# Patient Record
Sex: FEMALE | Race: WHITE | NOT HISPANIC OR LATINO | Employment: OTHER
[De-identification: names, ages, dates, MRNs, and addresses within clinical notes are randomized per-mention and may not be internally consistent; named-entity substitution may affect disease eponyms.]

---

## 2017-01-05 ENCOUNTER — RECORDS - HEALTHEAST (OUTPATIENT)
Dept: ADMINISTRATIVE | Facility: OTHER | Age: 77
End: 2017-01-05

## 2017-01-20 ENCOUNTER — RECORDS - HEALTHEAST (OUTPATIENT)
Dept: ADMINISTRATIVE | Facility: OTHER | Age: 77
End: 2017-01-20

## 2017-01-23 ENCOUNTER — OFFICE VISIT - HEALTHEAST (OUTPATIENT)
Dept: INTERNAL MEDICINE | Facility: CLINIC | Age: 77
End: 2017-01-23

## 2017-01-23 DIAGNOSIS — F41.9 ANXIETY: ICD-10-CM

## 2017-02-15 ENCOUNTER — COMMUNICATION - HEALTHEAST (OUTPATIENT)
Dept: INTERNAL MEDICINE | Facility: CLINIC | Age: 77
End: 2017-02-15

## 2017-02-21 ENCOUNTER — RECORDS - HEALTHEAST (OUTPATIENT)
Dept: ADMINISTRATIVE | Facility: OTHER | Age: 77
End: 2017-02-21

## 2017-02-21 ENCOUNTER — COMMUNICATION - HEALTHEAST (OUTPATIENT)
Dept: INTERNAL MEDICINE | Facility: CLINIC | Age: 77
End: 2017-02-21

## 2017-02-23 ENCOUNTER — COMMUNICATION - HEALTHEAST (OUTPATIENT)
Dept: INTERNAL MEDICINE | Facility: CLINIC | Age: 77
End: 2017-02-23

## 2017-02-24 ENCOUNTER — OFFICE VISIT - HEALTHEAST (OUTPATIENT)
Dept: INTERNAL MEDICINE | Facility: CLINIC | Age: 77
End: 2017-02-24

## 2017-02-24 DIAGNOSIS — F31.9 BIPOLAR DISORDER (H): ICD-10-CM

## 2017-03-06 ENCOUNTER — OFFICE VISIT - HEALTHEAST (OUTPATIENT)
Dept: PODIATRY | Facility: CLINIC | Age: 77
End: 2017-03-06

## 2017-03-06 DIAGNOSIS — L84 CALLUS OF FOOT: ICD-10-CM

## 2017-03-06 DIAGNOSIS — M79.673 PAIN OF FOOT, UNSPECIFIED LATERALITY: ICD-10-CM

## 2017-03-06 ASSESSMENT — MIFFLIN-ST. JEOR: SCORE: 1023.81

## 2017-04-11 ENCOUNTER — OFFICE VISIT - HEALTHEAST (OUTPATIENT)
Dept: INTERNAL MEDICINE | Facility: CLINIC | Age: 77
End: 2017-04-11

## 2017-04-11 ENCOUNTER — COMMUNICATION - HEALTHEAST (OUTPATIENT)
Dept: INTERNAL MEDICINE | Facility: CLINIC | Age: 77
End: 2017-04-11

## 2017-04-11 DIAGNOSIS — F31.9 BIPOLAR DISORDER (H): ICD-10-CM

## 2017-04-11 DIAGNOSIS — Z28.21 IMMUNIZATION REFUSED: ICD-10-CM

## 2017-04-11 DIAGNOSIS — I10 HTN (HYPERTENSION): ICD-10-CM

## 2017-04-11 DIAGNOSIS — G25.0 ESSENTIAL TREMOR: ICD-10-CM

## 2017-06-18 ENCOUNTER — COMMUNICATION - HEALTHEAST (OUTPATIENT)
Dept: INTERNAL MEDICINE | Facility: CLINIC | Age: 77
End: 2017-06-18

## 2017-06-20 ENCOUNTER — COMMUNICATION - HEALTHEAST (OUTPATIENT)
Dept: INTERNAL MEDICINE | Facility: CLINIC | Age: 77
End: 2017-06-20

## 2017-09-12 ENCOUNTER — OFFICE VISIT - HEALTHEAST (OUTPATIENT)
Dept: INTERNAL MEDICINE | Facility: CLINIC | Age: 77
End: 2017-09-12

## 2017-09-12 ENCOUNTER — COMMUNICATION - HEALTHEAST (OUTPATIENT)
Dept: INTERNAL MEDICINE | Facility: CLINIC | Age: 77
End: 2017-09-12

## 2017-09-12 DIAGNOSIS — G25.0 ESSENTIAL TREMOR: ICD-10-CM

## 2017-09-12 DIAGNOSIS — F31.9 BIPOLAR DISORDER (H): ICD-10-CM

## 2017-09-12 DIAGNOSIS — I10 HTN (HYPERTENSION): ICD-10-CM

## 2017-10-17 ENCOUNTER — COMMUNICATION - HEALTHEAST (OUTPATIENT)
Dept: INTERNAL MEDICINE | Facility: CLINIC | Age: 77
End: 2017-10-17

## 2017-10-17 DIAGNOSIS — F31.9 BIPOLAR DISORDER (H): ICD-10-CM

## 2018-03-14 ENCOUNTER — COMMUNICATION - HEALTHEAST (OUTPATIENT)
Dept: INTERNAL MEDICINE | Facility: CLINIC | Age: 78
End: 2018-03-14

## 2018-03-14 DIAGNOSIS — F31.9 BIPOLAR DISORDER (H): ICD-10-CM

## 2018-03-16 ENCOUNTER — COMMUNICATION - HEALTHEAST (OUTPATIENT)
Dept: INTERNAL MEDICINE | Facility: CLINIC | Age: 78
End: 2018-03-16

## 2018-03-29 ENCOUNTER — AMBULATORY - HEALTHEAST (OUTPATIENT)
Dept: LAB | Facility: CLINIC | Age: 78
End: 2018-03-29

## 2018-03-29 DIAGNOSIS — F31.9 BIPOLAR DISORDER (H): ICD-10-CM

## 2018-03-29 LAB
ANION GAP SERPL CALCULATED.3IONS-SCNC: 11 MMOL/L (ref 5–18)
BUN SERPL-MCNC: 13 MG/DL (ref 8–28)
CALCIUM SERPL-MCNC: 9.6 MG/DL (ref 8.5–10.5)
CHLORIDE BLD-SCNC: 104 MMOL/L (ref 98–107)
CO2 SERPL-SCNC: 24 MMOL/L (ref 22–31)
CREAT SERPL-MCNC: 0.81 MG/DL (ref 0.6–1.1)
GFR SERPL CREATININE-BSD FRML MDRD: >60 ML/MIN/1.73M2
GLUCOSE BLD-MCNC: 89 MG/DL (ref 70–125)
LITHIUM SERPL-SCNC: 0.7 MMOL/L (ref 0.6–1.2)
POTASSIUM BLD-SCNC: 4.7 MMOL/L (ref 3.5–5)
SODIUM SERPL-SCNC: 139 MMOL/L (ref 136–145)

## 2018-07-03 ENCOUNTER — COMMUNICATION - HEALTHEAST (OUTPATIENT)
Dept: INTERNAL MEDICINE | Facility: CLINIC | Age: 78
End: 2018-07-03

## 2018-07-03 ENCOUNTER — RECORDS - HEALTHEAST (OUTPATIENT)
Dept: ADMINISTRATIVE | Facility: OTHER | Age: 78
End: 2018-07-03

## 2018-07-05 ENCOUNTER — OFFICE VISIT - HEALTHEAST (OUTPATIENT)
Dept: INTERNAL MEDICINE | Facility: CLINIC | Age: 78
End: 2018-07-05

## 2018-07-05 DIAGNOSIS — S62.101A RIGHT WRIST FRACTURE: ICD-10-CM

## 2018-07-05 DIAGNOSIS — Z01.818 PRE-OP EXAM: ICD-10-CM

## 2018-07-05 DIAGNOSIS — Z71.89 ADVANCE DIRECTIVE DISCUSSED WITH PATIENT: ICD-10-CM

## 2018-07-05 DIAGNOSIS — I10 HTN (HYPERTENSION): ICD-10-CM

## 2018-07-05 DIAGNOSIS — G25.0 ESSENTIAL TREMOR: ICD-10-CM

## 2018-07-05 DIAGNOSIS — F31.9 BIPOLAR DISORDER (H): ICD-10-CM

## 2018-07-05 DIAGNOSIS — Z78.9 FULL CODE STATUS: ICD-10-CM

## 2018-07-05 LAB
ANION GAP SERPL CALCULATED.3IONS-SCNC: 10 MMOL/L (ref 5–18)
BUN SERPL-MCNC: 11 MG/DL (ref 8–28)
CALCIUM SERPL-MCNC: 9.5 MG/DL (ref 8.5–10.5)
CHLORIDE BLD-SCNC: 106 MMOL/L (ref 98–107)
CO2 SERPL-SCNC: 23 MMOL/L (ref 22–31)
CREAT SERPL-MCNC: 0.81 MG/DL (ref 0.6–1.1)
GFR SERPL CREATININE-BSD FRML MDRD: >60 ML/MIN/1.73M2
GLUCOSE BLD-MCNC: 97 MG/DL (ref 70–125)
HGB BLD-MCNC: 13.2 G/DL (ref 12–16)
LITHIUM SERPL-SCNC: 0.7 MMOL/L (ref 0.6–1.2)
POTASSIUM BLD-SCNC: 4.6 MMOL/L (ref 3.5–5)
SODIUM SERPL-SCNC: 139 MMOL/L (ref 136–145)

## 2018-07-05 ASSESSMENT — MIFFLIN-ST. JEOR: SCORE: 1007.93

## 2018-07-06 ENCOUNTER — COMMUNICATION - HEALTHEAST (OUTPATIENT)
Dept: INTERNAL MEDICINE | Facility: CLINIC | Age: 78
End: 2018-07-06

## 2018-07-06 ENCOUNTER — RECORDS - HEALTHEAST (OUTPATIENT)
Dept: ADMINISTRATIVE | Facility: OTHER | Age: 78
End: 2018-07-06

## 2018-07-06 LAB
ATRIAL RATE - MUSE: 62 BPM
DIASTOLIC BLOOD PRESSURE - MUSE: NORMAL MMHG
INTERPRETATION ECG - MUSE: NORMAL
P AXIS - MUSE: 62 DEGREES
PR INTERVAL - MUSE: 142 MS
QRS DURATION - MUSE: 78 MS
QT - MUSE: 402 MS
QTC - MUSE: 408 MS
R AXIS - MUSE: 35 DEGREES
SYSTOLIC BLOOD PRESSURE - MUSE: NORMAL MMHG
T AXIS - MUSE: 33 DEGREES
VENTRICULAR RATE- MUSE: 62 BPM

## 2018-07-18 ENCOUNTER — RECORDS - HEALTHEAST (OUTPATIENT)
Dept: ADMINISTRATIVE | Facility: OTHER | Age: 78
End: 2018-07-18

## 2018-07-20 ENCOUNTER — COMMUNICATION - HEALTHEAST (OUTPATIENT)
Dept: INTERNAL MEDICINE | Facility: CLINIC | Age: 78
End: 2018-07-20

## 2018-07-20 DIAGNOSIS — F31.9 BIPOLAR DISORDER (H): ICD-10-CM

## 2018-08-31 ENCOUNTER — COMMUNICATION - HEALTHEAST (OUTPATIENT)
Dept: INTERNAL MEDICINE | Facility: CLINIC | Age: 78
End: 2018-08-31

## 2018-09-12 ENCOUNTER — RECORDS - HEALTHEAST (OUTPATIENT)
Dept: ADMINISTRATIVE | Facility: OTHER | Age: 78
End: 2018-09-12

## 2018-10-22 ENCOUNTER — COMMUNICATION - HEALTHEAST (OUTPATIENT)
Dept: INTERNAL MEDICINE | Facility: CLINIC | Age: 78
End: 2018-10-22

## 2018-10-22 DIAGNOSIS — F31.9 BIPOLAR DISORDER (H): ICD-10-CM

## 2019-01-20 ENCOUNTER — COMMUNICATION - HEALTHEAST (OUTPATIENT)
Dept: INTERNAL MEDICINE | Facility: CLINIC | Age: 79
End: 2019-01-20

## 2019-01-20 DIAGNOSIS — F31.9 BIPOLAR DISORDER (H): ICD-10-CM

## 2019-02-25 ENCOUNTER — COMMUNICATION - HEALTHEAST (OUTPATIENT)
Dept: INTERNAL MEDICINE | Facility: CLINIC | Age: 79
End: 2019-02-25

## 2019-02-25 DIAGNOSIS — F31.9 BIPOLAR DISORDER (H): ICD-10-CM

## 2019-05-13 ENCOUNTER — COMMUNICATION - HEALTHEAST (OUTPATIENT)
Dept: INTERNAL MEDICINE | Facility: CLINIC | Age: 79
End: 2019-05-13

## 2019-06-05 ENCOUNTER — COMMUNICATION - HEALTHEAST (OUTPATIENT)
Dept: INTERNAL MEDICINE | Facility: CLINIC | Age: 79
End: 2019-06-05

## 2019-06-05 DIAGNOSIS — F31.9 BIPOLAR DISORDER (H): ICD-10-CM

## 2019-07-08 ENCOUNTER — COMMUNICATION - HEALTHEAST (OUTPATIENT)
Dept: INTERNAL MEDICINE | Facility: CLINIC | Age: 79
End: 2019-07-08

## 2019-07-08 DIAGNOSIS — F31.9 BIPOLAR DISORDER (H): ICD-10-CM

## 2019-07-12 ENCOUNTER — OFFICE VISIT - HEALTHEAST (OUTPATIENT)
Dept: INTERNAL MEDICINE | Facility: CLINIC | Age: 79
End: 2019-07-12

## 2019-07-12 DIAGNOSIS — Z87.891 FORMER SMOKER: ICD-10-CM

## 2019-07-12 DIAGNOSIS — F31.74 BIPOLAR DISORDER, IN FULL REMISSION, MOST RECENT EPISODE MANIC (H): ICD-10-CM

## 2019-07-12 DIAGNOSIS — I10 ESSENTIAL HYPERTENSION: ICD-10-CM

## 2019-07-12 DIAGNOSIS — R06.2 WHEEZING: ICD-10-CM

## 2019-07-12 DIAGNOSIS — G25.0 ESSENTIAL TREMOR: ICD-10-CM

## 2019-07-12 DIAGNOSIS — Z00.00 MEDICARE ANNUAL WELLNESS VISIT, SUBSEQUENT: ICD-10-CM

## 2019-07-12 DIAGNOSIS — R06.02 SOB (SHORTNESS OF BREATH): ICD-10-CM

## 2019-07-12 LAB
ANION GAP SERPL CALCULATED.3IONS-SCNC: 10 MMOL/L (ref 5–18)
BASOPHILS # BLD AUTO: 0 THOU/UL (ref 0–0.2)
BASOPHILS NFR BLD AUTO: 1 % (ref 0–2)
BUN SERPL-MCNC: 17 MG/DL (ref 8–28)
CALCIUM SERPL-MCNC: 9.6 MG/DL (ref 8.5–10.5)
CHLORIDE BLD-SCNC: 109 MMOL/L (ref 98–107)
CO2 SERPL-SCNC: 22 MMOL/L (ref 22–31)
CREAT SERPL-MCNC: 0.84 MG/DL (ref 0.6–1.1)
EOSINOPHIL # BLD AUTO: 0.1 THOU/UL (ref 0–0.4)
EOSINOPHIL NFR BLD AUTO: 2 % (ref 0–6)
ERYTHROCYTE [DISTWIDTH] IN BLOOD BY AUTOMATED COUNT: 12.1 % (ref 11–14.5)
GFR SERPL CREATININE-BSD FRML MDRD: >60 ML/MIN/1.73M2
GLUCOSE BLD-MCNC: 73 MG/DL (ref 70–125)
HCT VFR BLD AUTO: 40.4 % (ref 35–47)
HGB BLD-MCNC: 13.7 G/DL (ref 12–16)
LITHIUM SERPL-SCNC: 0.4 MMOL/L (ref 0.6–1.2)
LYMPHOCYTES # BLD AUTO: 2.3 THOU/UL (ref 0.8–4.4)
LYMPHOCYTES NFR BLD AUTO: 26 % (ref 20–40)
MCH RBC QN AUTO: 29.8 PG (ref 27–34)
MCHC RBC AUTO-ENTMCNC: 33.9 G/DL (ref 32–36)
MCV RBC AUTO: 88 FL (ref 80–100)
MONOCYTES # BLD AUTO: 0.6 THOU/UL (ref 0–0.9)
MONOCYTES NFR BLD AUTO: 7 % (ref 2–10)
NEUTROPHILS # BLD AUTO: 5.7 THOU/UL (ref 2–7.7)
NEUTROPHILS NFR BLD AUTO: 65 % (ref 50–70)
PLATELET # BLD AUTO: 233 THOU/UL (ref 140–440)
PMV BLD AUTO: 8.2 FL (ref 7–10)
POTASSIUM BLD-SCNC: 4.1 MMOL/L (ref 3.5–5)
RBC # BLD AUTO: 4.6 MILL/UL (ref 3.8–5.4)
SODIUM SERPL-SCNC: 141 MMOL/L (ref 136–145)
WBC: 8.7 THOU/UL (ref 4–11)

## 2019-07-12 ASSESSMENT — MIFFLIN-ST. JEOR: SCORE: 1009.06

## 2019-07-13 LAB — BNP SERPL-MCNC: 160 PG/ML (ref 0–151)

## 2019-07-14 ENCOUNTER — AMBULATORY - HEALTHEAST (OUTPATIENT)
Dept: INTERNAL MEDICINE | Facility: CLINIC | Age: 79
End: 2019-07-14

## 2019-07-14 DIAGNOSIS — R06.02 SOB (SHORTNESS OF BREATH): ICD-10-CM

## 2019-07-14 DIAGNOSIS — Z87.891 FORMER SMOKER: ICD-10-CM

## 2019-07-14 DIAGNOSIS — R06.2 WHEEZING: ICD-10-CM

## 2019-07-23 ENCOUNTER — HOSPITAL ENCOUNTER (OUTPATIENT)
Dept: RESPIRATORY THERAPY | Facility: HOSPITAL | Age: 79
Discharge: HOME | End: 2019-07-23
Attending: INTERNAL MEDICINE

## 2019-07-23 DIAGNOSIS — R06.2 WHEEZING: ICD-10-CM

## 2019-07-23 DIAGNOSIS — Z87.891 FORMER SMOKER: ICD-10-CM

## 2019-07-23 DIAGNOSIS — R06.02 SOB (SHORTNESS OF BREATH): ICD-10-CM

## 2019-07-25 ENCOUNTER — COMMUNICATION - HEALTHEAST (OUTPATIENT)
Dept: INTERNAL MEDICINE | Facility: CLINIC | Age: 79
End: 2019-07-25

## 2019-08-04 ENCOUNTER — COMMUNICATION - HEALTHEAST (OUTPATIENT)
Dept: INTERNAL MEDICINE | Facility: CLINIC | Age: 79
End: 2019-08-04

## 2019-08-04 DIAGNOSIS — F31.9 BIPOLAR DISORDER (H): ICD-10-CM

## 2019-08-08 ENCOUNTER — COMMUNICATION - HEALTHEAST (OUTPATIENT)
Dept: INTERNAL MEDICINE | Facility: CLINIC | Age: 79
End: 2019-08-08

## 2019-08-08 DIAGNOSIS — F31.9 BIPOLAR DISORDER (H): ICD-10-CM

## 2020-05-29 ENCOUNTER — COMMUNICATION - HEALTHEAST (OUTPATIENT)
Dept: INTERNAL MEDICINE | Facility: CLINIC | Age: 80
End: 2020-05-29

## 2020-05-29 DIAGNOSIS — F31.9 BIPOLAR DISORDER (H): ICD-10-CM

## 2020-07-06 ENCOUNTER — OFFICE VISIT - HEALTHEAST (OUTPATIENT)
Dept: INTERNAL MEDICINE | Facility: CLINIC | Age: 80
End: 2020-07-06

## 2020-07-06 DIAGNOSIS — J44.9 CHRONIC OBSTRUCTIVE PULMONARY DISEASE, UNSPECIFIED COPD TYPE (H): ICD-10-CM

## 2020-07-06 DIAGNOSIS — G25.0 ESSENTIAL TREMOR: ICD-10-CM

## 2020-07-06 DIAGNOSIS — I10 ESSENTIAL HYPERTENSION: ICD-10-CM

## 2020-07-06 DIAGNOSIS — F31.9 BIPOLAR AFFECTIVE DISORDER, REMISSION STATUS UNSPECIFIED (H): ICD-10-CM

## 2020-07-06 LAB
ANION GAP SERPL CALCULATED.3IONS-SCNC: 11 MMOL/L (ref 5–18)
BUN SERPL-MCNC: 12 MG/DL (ref 8–28)
CALCIUM SERPL-MCNC: 9.2 MG/DL (ref 8.5–10.5)
CHLORIDE BLD-SCNC: 104 MMOL/L (ref 98–107)
CO2 SERPL-SCNC: 24 MMOL/L (ref 22–31)
CREAT SERPL-MCNC: 0.83 MG/DL (ref 0.6–1.1)
GFR SERPL CREATININE-BSD FRML MDRD: >60 ML/MIN/1.73M2
GLUCOSE BLD-MCNC: 88 MG/DL (ref 70–125)
LITHIUM SERPL-SCNC: <0.2 MMOL/L (ref 0.6–1.2)
POTASSIUM BLD-SCNC: 4.1 MMOL/L (ref 3.5–5)
SODIUM SERPL-SCNC: 139 MMOL/L (ref 136–145)

## 2020-07-06 ASSESSMENT — MIFFLIN-ST. JEOR: SCORE: 1020.97

## 2020-07-16 ENCOUNTER — COMMUNICATION - HEALTHEAST (OUTPATIENT)
Dept: INTERNAL MEDICINE | Facility: CLINIC | Age: 80
End: 2020-07-16

## 2020-08-30 ENCOUNTER — COMMUNICATION - HEALTHEAST (OUTPATIENT)
Dept: INTERNAL MEDICINE | Facility: CLINIC | Age: 80
End: 2020-08-30

## 2020-08-30 DIAGNOSIS — F31.9 BIPOLAR AFFECTIVE DISORDER, REMISSION STATUS UNSPECIFIED (H): ICD-10-CM

## 2020-11-25 ENCOUNTER — COMMUNICATION - HEALTHEAST (OUTPATIENT)
Dept: INTERNAL MEDICINE | Facility: CLINIC | Age: 80
End: 2020-11-25

## 2020-11-25 ENCOUNTER — COMMUNICATION - HEALTHEAST (OUTPATIENT)
Dept: SCHEDULING | Facility: CLINIC | Age: 80
End: 2020-11-25

## 2020-11-29 ENCOUNTER — HOME CARE/HOSPICE - HEALTHEAST (OUTPATIENT)
Dept: HOME HEALTH SERVICES | Facility: HOME HEALTH | Age: 80
End: 2020-11-29

## 2020-12-01 ENCOUNTER — AMBULATORY - HEALTHEAST (OUTPATIENT)
Dept: CARE COORDINATION | Facility: CLINIC | Age: 80
End: 2020-12-01

## 2020-12-01 ENCOUNTER — HOME CARE/HOSPICE - HEALTHEAST (OUTPATIENT)
Dept: HOME HEALTH SERVICES | Facility: HOME HEALTH | Age: 80
End: 2020-12-01

## 2020-12-01 ENCOUNTER — COMMUNICATION - HEALTHEAST (OUTPATIENT)
Dept: HOME HEALTH SERVICES | Facility: HOME HEALTH | Age: 80
End: 2020-12-01

## 2020-12-01 ENCOUNTER — COMMUNICATION - HEALTHEAST (OUTPATIENT)
Dept: NURSING | Facility: CLINIC | Age: 80
End: 2020-12-01

## 2020-12-01 DIAGNOSIS — I63.512 LEFT MIDDLE CEREBRAL ARTERY STROKE (H): ICD-10-CM

## 2020-12-02 ENCOUNTER — COMMUNICATION - HEALTHEAST (OUTPATIENT)
Dept: HOME HEALTH SERVICES | Facility: HOME HEALTH | Age: 80
End: 2020-12-02

## 2020-12-02 ENCOUNTER — HOME CARE/HOSPICE - HEALTHEAST (OUTPATIENT)
Dept: HOME HEALTH SERVICES | Facility: HOME HEALTH | Age: 80
End: 2020-12-02

## 2020-12-02 ENCOUNTER — COMMUNICATION - HEALTHEAST (OUTPATIENT)
Dept: NURSING | Facility: CLINIC | Age: 80
End: 2020-12-02

## 2020-12-03 ENCOUNTER — HOME CARE/HOSPICE - HEALTHEAST (OUTPATIENT)
Dept: HOME HEALTH SERVICES | Facility: HOME HEALTH | Age: 80
End: 2020-12-03

## 2020-12-03 ENCOUNTER — TELEPHONE (OUTPATIENT)
Dept: NEUROLOGY | Facility: CLINIC | Age: 80
End: 2020-12-03

## 2020-12-03 NOTE — TELEPHONE ENCOUNTER
Pt's sisterr called to ask that written orders be faxed to the home care nurse re directions on Hydralazine. Pt's BP and leg weakness are a issue. Fax order to [phone]

## 2020-12-04 ENCOUNTER — HOME CARE/HOSPICE - HEALTHEAST (OUTPATIENT)
Dept: HOME HEALTH SERVICES | Facility: HOME HEALTH | Age: 80
End: 2020-12-04

## 2020-12-04 ENCOUNTER — COMMUNICATION - HEALTHEAST (OUTPATIENT)
Dept: INTERNAL MEDICINE | Facility: CLINIC | Age: 80
End: 2020-12-04

## 2020-12-04 ENCOUNTER — VIRTUAL VISIT (OUTPATIENT)
Dept: NEUROLOGY | Facility: CLINIC | Age: 80
End: 2020-12-04
Payer: COMMERCIAL

## 2020-12-04 ENCOUNTER — COMMUNICATION - HEALTHEAST (OUTPATIENT)
Dept: HOME HEALTH SERVICES | Facility: HOME HEALTH | Age: 80
End: 2020-12-04

## 2020-12-04 ENCOUNTER — RECORDS - HEALTHEAST (OUTPATIENT)
Dept: ADMINISTRATIVE | Facility: OTHER | Age: 80
End: 2020-12-04

## 2020-12-04 VITALS — BODY MASS INDEX: 23.04 KG/M2 | HEIGHT: 63 IN | WEIGHT: 130 LBS

## 2020-12-04 DIAGNOSIS — I63.512 LEFT MIDDLE CEREBRAL ARTERY STROKE (H): Primary | ICD-10-CM

## 2020-12-04 DIAGNOSIS — G93.41 METABOLIC ENCEPHALOPATHY: ICD-10-CM

## 2020-12-04 PROBLEM — G45.9 TIA (TRANSIENT ISCHEMIC ATTACK): Status: ACTIVE | Noted: 2020-11-23

## 2020-12-04 PROBLEM — K21.9 GERD (GASTROESOPHAGEAL REFLUX DISEASE): Status: ACTIVE | Noted: 2020-12-04

## 2020-12-04 PROBLEM — F31.9 BIPOLAR DISORDER (H): Status: ACTIVE | Noted: 2017-02-27

## 2020-12-04 PROBLEM — R47.1 DYSARTHRIA: Status: ACTIVE | Noted: 2020-12-04

## 2020-12-04 PROBLEM — R29.898 RIGHT ARM WEAKNESS: Status: ACTIVE | Noted: 2020-11-23

## 2020-12-04 PROBLEM — G25.0 ESSENTIAL TREMOR: Status: ACTIVE | Noted: 2020-12-04

## 2020-12-04 PROBLEM — J44.9 COPD (CHRONIC OBSTRUCTIVE PULMONARY DISEASE) (H): Status: ACTIVE | Noted: 2020-07-08

## 2020-12-04 PROBLEM — I10 HTN (HYPERTENSION): Status: ACTIVE | Noted: 2020-11-23

## 2020-12-04 PROBLEM — G40.909 SEIZURE DISORDER (H): Status: ACTIVE | Noted: 2020-12-04

## 2020-12-04 PROBLEM — Z78.9 FULL CODE STATUS: Status: ACTIVE | Noted: 2018-07-05

## 2020-12-04 PROCEDURE — 99215 OFFICE O/P EST HI 40 MIN: CPT | Mod: 95 | Performed by: PSYCHIATRY & NEUROLOGY

## 2020-12-04 RX ORDER — ATORVASTATIN CALCIUM 40 MG/1
TABLET, FILM COATED ORAL
COMMUNITY
Start: 2020-11-24

## 2020-12-04 RX ORDER — FLUOXETINE 10 MG/1
10 CAPSULE ORAL
COMMUNITY
Start: 2020-07-06

## 2020-12-04 RX ORDER — LITHIUM CARBONATE 300 MG/1
TABLET, FILM COATED, EXTENDED RELEASE ORAL
COMMUNITY
Start: 2020-05-29

## 2020-12-04 RX ORDER — ASPIRIN 325 MG
325 TABLET ORAL
COMMUNITY
Start: 2020-11-25

## 2020-12-04 RX ORDER — LEVETIRACETAM 250 MG/1
250 TABLET ORAL
COMMUNITY
Start: 2020-11-30

## 2020-12-04 RX ORDER — HYDRALAZINE HYDROCHLORIDE 25 MG/1
25 TABLET, FILM COATED ORAL
COMMUNITY
Start: 2020-11-30

## 2020-12-04 SDOH — HEALTH STABILITY: MENTAL HEALTH: HOW MANY STANDARD DRINKS CONTAINING ALCOHOL DO YOU HAVE ON A TYPICAL DAY?: NOT ASKED

## 2020-12-04 SDOH — HEALTH STABILITY: MENTAL HEALTH: HOW OFTEN DO YOU HAVE A DRINK CONTAINING ALCOHOL?: MONTHLY OR LESS

## 2020-12-04 SDOH — HEALTH STABILITY: MENTAL HEALTH: HOW OFTEN DO YOU HAVE 6 OR MORE DRINKS ON ONE OCCASION?: NOT ASKED

## 2020-12-04 ASSESSMENT — MIFFLIN-ST. JEOR: SCORE: 1028.81

## 2020-12-04 NOTE — LETTER
12/4/2020         RE: Lizzy Kowalski  [address] [facility] [id number]        Dear Colleague,    Thank you for referring your patient, Lizzy Kowalski, to the [facility] [place]. Please see a copy of my visit note below.        NEUROLOGY NOTE        Assessment/Plan          Encephalopathy which resolved during hospitalization after tapering down and off Keppra.      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  with occlusion of left M1 but seem to have adequate collateral.Echo ok. No a-fib on monitoring.  Since discharge from the hospital was obtunded on 12/2 and 3.  But has becoming more aphasic and completely flaccid in the right side both upper and lower extremities since then.    Left M1 occlusion with established reconstitution, severe stenosis right P1 P2 junction with abrupt occlusion of right PCA.  Recommend aspirin and cholesterol lowering agent to keep LDL below 70.     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    Hypertension.     Plan/recommendations:    Aspirin and simvastatin continue    Recommend to go to the ER for a CT scan to make sure there is no hemorrhagic transformation.  Plans on the CT finding to decide the blood pressure parameters goals, DVT prophylaxis management options, further therapy and care needs.    Outpt vascular surgery consultation for left M1 occlusion.   Requested consultation at [facility] during hospitalization, but vascular surgeon called in requesting us to cancel the consultation.    Disposition per primary team.           This is a virtual visit due to COVID-19 Pandemic to mitigate potential disease spreading. Consent obtained for charge with this visit.  Hospital chart reviewed extensively.  Total time spent about 50 minutes.  Discussed about diagnosis and differential for clinical worsening including potential encephalopathy,  drug interaction and the lithium toxicity.  Discussed the importance of having a CT scan to make sure there is no hemorrhagic transformation, DVT prophylaxis, blood pressure parameter goals, as well as safety issues, including potential exposure to COVID-19, hospital visits, and home care by family and home health.       SUBJECTIVE       Lizzy Kowalski is a 80 year old female seen for worsening stroke symptoms.      I saw the patient late November 2020 for stroke.  Initially she had recurrent spell of aphasia and right facial droop may be right hand weakness.  Repeated MRI study was negative initially and EEG showing left sided slowing.  Head MRA/CTA showing left M1 segment occlusion with collateral establishments.  But unfortunately she did eventually stroke in the watershed zone.  Was placed on aspirin and statin medication, kept the blood pressure in higher range.  She was discharged in stable state.  She was under 24/7 care by family members.  On Wednesday she was noted that she was much obtunded.  Initially it was thought that he had lithium toxicity.  However since she came around she was noted to have severe right-sided weakness completely paralyzed in the upper and lower extremity.  When she was discharged she had only weakness in the upper but no difficult weakness in the right lower extremity.  She was talking better.    She was treated with Keppra empirically with EEG showing left-sided slowing without MRI correlates of stroke.  But tapered off after MRI demonstrated clear for contributing to her symptoms.  Spinal tap 11/25/2020 showing 32 RBC otherwise normal.    Work-ups:       Repeat MRI study showing no significant changes 11/24/2020.  But there are some chronic changes on the left MCA distribution.     EEG showing left-sided disorganized background slowing higher amplitude with some sharp look-alike activities.  EEG study 11/24/2020.  Repeat EEG study 11/27/2020 generalized background  slowing but the significant asymmetry with left-sided slowing disappeared.     MRI diffusion study of the brain with MRA head neck nothing acute and no significant obstructions found.     HEAD CT:  1.  Mild age-related changes with no acute intracranial abnormality.     HEAD CTA:   1.  Occlusion of the left M1 segment of the left MCA with abundant accompanying collateral formation, contributing to reconstitution at the left M2 segments and distal left MCA branches. Given the extensive collaterals, this is favored to be more chronic   in nature. Brain MRI would be of benefit in confirming this suspicion of a more chronic M1 segment occlusion.     2.  Severe stenosis of the right P1 P2 junction with abrupt occlusion of the right PCA at the proximal right P2 segment. Brain MRI and head MRA would be of benefit in determining the acuity of this finding.     3.  2 mm broad-based inferiorly directed outpouching off of the communicating segment of the left internal carotid artery raises concern for a small aneurysm.     NECK CTA:  1.  No high-grade stenosis of the neck vessels by NASCET criteria, with mild narrowing of the proximal right internal carotid artery and mild left vertebral artery ostial stenosis.  2.  Emphysematous changes at the bilateral lung apices.     IMPRESSION:   1.  There are new localized small patchy areas of acute-subacute infarct in the parasagittal left cerebral white matter greater anteriorly than posteriorly in a watershed type distribution. No mass effect or superimposed hemorrhage. No other acute or   subacute infarcts on the diffusion sequence.  2.  Stable mild age-related changes.  3.  Findings discussed with Dr. La from neurology at 3:40 PM on 11/27/2020.     Summary       Normal left ventricular size with normal wall thickness.    Left ventricle ejection fraction is normal. The calculated left ventricular ejection fraction is 61%.    Normal right ventricular size and systolic  "function.    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    No previous study for comparison.      Spinal tap normal largely.  Results for SALVADOR NGUYEN (MRN [id number]) as of 12/4/2020 17:11   Ref. Range 11/25/2020 16:30   Glucose, CSF Latest Ref Range: 40 - 75 mg/dL 64   Protein, CSF Latest Ref Range: 15 - 45 mg/dL 33   Tube Number, CSF Unknown 4   Appearance, CSF Latest Ref Range: Clear  Clear   Color, CSF Latest Ref Range: Colorless  Colorless   Volume, CSF Latest Units: ml 3.0   WBC, CSF Latest Ref Range: <6 /ul 1   RBC, CSF Latest Ref Range: <2 /ul 32 (H)            Review of system     10 point system review otherwise unremarkable    PHYSICAL EXAMINATION     Vital signs in last 24 hours:  Vitals:    12/04/20 1028   Weight: 59 kg (130 lb)   Height: 1.6 m (5' 3\")       Patient sitting in chair no acute distress.  Severe aphasia, more expressive aphasia.  Cranial nerves II through XII significant for subtle facial weakness on the right.  She has hemiplegia on the right side.  Moving the left side adequately and spontaneously.  Not able to test sensory function adequately.  Poor coordination on the right.  Not able to stand up or ambulate at this point.      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       COPD (chronic obstructive pulmonary " disease) (H) 07/08/2020     Priority: Medium     PFT Complete Study:  Report     The spirometry was performed with good reproducibility.  The flow volume loop is abnormal.  The concave appearance of the expiratory limb of the flow-volume loop is consistent with an obstructive ventilatory defect     FEV1 is 1.20 L (67% predicted) and is moderately reduced.  FVC is 2.13 L (90% predicted) and is normal.  FEV1/FVC is 57% and is reduced.     There was no improvement in spirometry after a single inhaled dose of bronchodilator.     TLC is 5.19 L (114% predicted) and is normal.  RV is 3.37 L (160% predicted) and is moderately increased.  RV/TLC ratio is 65%, which is 141% predicted and increased.     DLCO is 8.53 mL/min/mmHg (46% predicted) and is moderately reduced when it is corrected for hemoglobin.     Impression:  This pulmonary function study is abnormal.  Spirometry is consistent with a moderate obstructive ventilatory defect. There was no bronchodilator response. Residual volume and RV/TLC ratio are increased, suggestive of air trapping. Diffusion capacity, when corrected for hemoglobin, is moderately reduced.      Impression completed by:  Kristin Mann MD, MPH  Pulmonary & Critical Care Medicine       Full code status 07/05/2018     Priority: Medium     Bipolar disorder (H) 02/27/2017     Priority: Medium     Former smoker 01/27/2016     Priority: Medium         Past medical history     Hypertension      Family history     No family history of seizure disorder.    Social history     Social History     Socioeconomic History     Marital status: Single     Spouse name: Not on file     Number of children: Not on file     Years of education: Not on file     Highest education level: Not on file   Occupational History     Not on file   Social Needs     Financial resource strain: Not on file     Food insecurity     Worry: Not on file     Inability: Not on file     Transportation needs     Medical: Not on file      Non-medical: Not on file   Tobacco Use     Smoking status: Former Smoker     Quit date: 2010     Years since quitting: 10.9     Smokeless tobacco: Never Used   Substance and Sexual Activity     Alcohol use: Yes     Frequency: Monthly or less     Drug use: Not on file     Sexual activity: Not on file   Lifestyle     Physical activity     Days per week: Not on file     Minutes per session: Not on file     Stress: Not on file   Relationships     Social connections     Talks on phone: Not on file     Gets together: Not on file     Attends Mosque service: Not on file     Active member of club or organization: Not on file     Attends meetings of clubs or organizations: Not on file     Relationship status: Not on file     Intimate partner violence     Fear of current or ex partner: Not on file     Emotionally abused: Not on file     Physically abused: Not on file     Forced sexual activity: Not on file   Other Topics Concern     Not on file   Social History Narrative     Not on file         Allergy     Amlodipine, Prednisone, and Sulfa drugs    MEDICATIONS List     Current Outpatient Medications   Medication Sig Dispense Refill     aspirin (ASA) 325 MG tablet Take 325 mg by mouth       atorvastatin (LIPITOR) 40 MG tablet TAKE 1 TABLET BY MOUTH EVERYDAY AT BEDTIME       FLUoxetine (PROZAC) 10 MG capsule Take 10 mg by mouth       hydrALAZINE (APRESOLINE) 25 MG tablet Take 25 mg by mouth       levETIRAcetam (KEPPRA) 250 MG tablet Take 250 mg by mouth       lithium ER (LITHOBID) 300 MG CR tablet TAKE 1 TABLET BY MOUTH TWICE A DAY                       Suzanne La MD, MD, PhD  Neurology   Office tel: [phone]        This note was dictated using voice recognition software.  Any grammatical or context distortions are unintentional and inherent to the software. The note is tailored to serve physicians for communications among them, who knows what are the most important elements of history taken for disease diagnosis and  differentials as well as management plans. Due to time factors, the notes are in general not reviewed before signing. Again due to time factor, follow-up notes often carries over old notes if they are relevant, so most clinic time is dedicated to interviewing with patients and caregivers, on clinical assessment, coordinating care and management.         Again, thank you for allowing me to participate in the care of your patient.        Sincerely,        Suzanne La MD

## 2020-12-04 NOTE — PROGRESS NOTES
NEUROLOGY NOTE        Assessment/Plan          Encephalopathy which resolved during hospitalization after tapering down and off Keppra.      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  with occlusion of left M1 but seem to have adequate collateral.Echo ok. No a-fib on monitoring.  Since discharge from the hospital was obtunded on 12/2 and 3.  But has becoming more aphasic and completely flaccid in the right side both upper and lower extremities since then.    Left M1 occlusion with established reconstitution, severe stenosis right P1 P2 junction with abrupt occlusion of right PCA.  Recommend aspirin and cholesterol lowering agent to keep LDL below 70.     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    Hypertension.     Plan/recommendations:    Aspirin and simvastatin continue    Recommend to go to the ER for a CT scan to make sure there is no hemorrhagic transformation.  Plans on the CT finding to decide the blood pressure parameters goals, DVT prophylaxis management options, further therapy and care needs.    Outpt vascular surgery consultation for left M1 occlusion.   Requested consultation at [facility] during hospitalization, but vascular surgeon called in requesting us to cancel the consultation.    Disposition per primary team.           This is a virtual visit due to COVID-19 Pandemic to mitigate potential disease spreading. Consent obtained for charge with this visit.  Hospital chart reviewed extensively.  Total time spent about 50 minutes.  Discussed about diagnosis and differential for clinical worsening including potential encephalopathy, drug interaction and the lithium toxicity.  Discussed the importance of having a CT scan to make sure there is no hemorrhagic transformation, DVT prophylaxis, blood pressure parameter goals, as well as safety issues, including potential exposure to COVID-19, hospital  visits, and home care by family and home health.       SUBJECTIVE       Lizzy Kowalski is a 80 year old female seen for worsening stroke symptoms.      I saw the patient late November 2020 for stroke.  Initially she had recurrent spell of aphasia and right facial droop may be right hand weakness.  Repeated MRI study was negative initially and EEG showing left sided slowing.  Head MRA/CTA showing left M1 segment occlusion with collateral establishments.  But unfortunately she did eventually stroke in the watershed zone.  Was placed on aspirin and statin medication, kept the blood pressure in higher range.  She was discharged in stable state.  She was under 24/7 care by family members.  On Wednesday she was noted that she was much obtunded.  Initially it was thought that he had lithium toxicity.  However since she came around she was noted to have severe right-sided weakness completely paralyzed in the upper and lower extremity.  When she was discharged she had only weakness in the upper but no difficult weakness in the right lower extremity.  She was talking better.    She was treated with Keppra empirically with EEG showing left-sided slowing without MRI correlates of stroke.  But tapered off after MRI demonstrated clear for contributing to her symptoms.  Spinal tap 11/25/2020 showing 32 RBC otherwise normal.    Work-ups:       Repeat MRI study showing no significant changes 11/24/2020.  But there are some chronic changes on the left MCA distribution.     EEG showing left-sided disorganized background slowing higher amplitude with some sharp look-alike activities.  EEG study 11/24/2020.  Repeat EEG study 11/27/2020 generalized background slowing but the significant asymmetry with left-sided slowing disappeared.     MRI diffusion study of the brain with MRA head neck nothing acute and no significant obstructions found.     HEAD CT:  1.  Mild age-related changes with no acute intracranial  abnormality.     HEAD CTA:   1.  Occlusion of the left M1 segment of the left MCA with abundant accompanying collateral formation, contributing to reconstitution at the left M2 segments and distal left MCA branches. Given the extensive collaterals, this is favored to be more chronic   in nature. Brain MRI would be of benefit in confirming this suspicion of a more chronic M1 segment occlusion.     2.  Severe stenosis of the right P1 P2 junction with abrupt occlusion of the right PCA at the proximal right P2 segment. Brain MRI and head MRA would be of benefit in determining the acuity of this finding.     3.  2 mm broad-based inferiorly directed outpouching off of the communicating segment of the left internal carotid artery raises concern for a small aneurysm.     NECK CTA:  1.  No high-grade stenosis of the neck vessels by NASCET criteria, with mild narrowing of the proximal right internal carotid artery and mild left vertebral artery ostial stenosis.  2.  Emphysematous changes at the bilateral lung apices.     IMPRESSION:   1.  There are new localized small patchy areas of acute-subacute infarct in the parasagittal left cerebral white matter greater anteriorly than posteriorly in a watershed type distribution. No mass effect or superimposed hemorrhage. No other acute or   subacute infarcts on the diffusion sequence.  2.  Stable mild age-related changes.  3.  Findings discussed with Dr. La from neurology at 3:40 PM on 11/27/2020.     Summary       Normal left ventricular size with normal wall thickness.    Left ventricle ejection fraction is normal. The calculated left ventricular ejection fraction is 61%.    Normal right ventricular size and systolic function.    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    No previous study for comparison.      Spinal tap normal largely.  Results for SALVADOR NGUYEN (MRN [id number]) as of 12/4/2020 17:11   Ref.  "Range 11/25/2020 16:30   Glucose, CSF Latest Ref Range: 40 - 75 mg/dL 64   Protein, CSF Latest Ref Range: 15 - 45 mg/dL 33   Tube Number, CSF Unknown 4   Appearance, CSF Latest Ref Range: Clear  Clear   Color, CSF Latest Ref Range: Colorless  Colorless   Volume, CSF Latest Units: ml 3.0   WBC, CSF Latest Ref Range: <6 /ul 1   RBC, CSF Latest Ref Range: <2 /ul 32 (H)            Review of system     10 point system review otherwise unremarkable    PHYSICAL EXAMINATION     Vital signs in last 24 hours:  Vitals:    12/04/20 1028   Weight: 59 kg (130 lb)   Height: 1.6 m (5' 3\")       Patient sitting in chair no acute distress.  Severe aphasia, more expressive aphasia.  Cranial nerves II through XII significant for subtle facial weakness on the right.  She has hemiplegia on the right side.  Moving the left side adequately and spontaneously.  Not able to test sensory function adequately.  Poor coordination on the right.  Not able to stand up or ambulate at this point.      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       COPD (chronic obstructive pulmonary disease) (H) 07/08/2020     Priority: Medium     PFT Complete Study:  Report     The spirometry was performed with good reproducibility.  The flow volume loop is abnormal.  The concave appearance of the expiratory limb of the flow-volume loop is consistent with an " obstructive ventilatory defect     FEV1 is 1.20 L (67% predicted) and is moderately reduced.  FVC is 2.13 L (90% predicted) and is normal.  FEV1/FVC is 57% and is reduced.     There was no improvement in spirometry after a single inhaled dose of bronchodilator.     TLC is 5.19 L (114% predicted) and is normal.  RV is 3.37 L (160% predicted) and is moderately increased.  RV/TLC ratio is 65%, which is 141% predicted and increased.     DLCO is 8.53 mL/min/mmHg (46% predicted) and is moderately reduced when it is corrected for hemoglobin.     Impression:  This pulmonary function study is abnormal.  Spirometry is consistent with a moderate obstructive ventilatory defect. There was no bronchodilator response. Residual volume and RV/TLC ratio are increased, suggestive of air trapping. Diffusion capacity, when corrected for hemoglobin, is moderately reduced.      Impression completed by:  Kristin Mann MD, MPH  Pulmonary & Critical Care Medicine       Full code status 07/05/2018     Priority: Medium     Bipolar disorder (H) 02/27/2017     Priority: Medium     Former smoker 01/27/2016     Priority: Medium         Past medical history     Hypertension      Family history     No family history of seizure disorder.    Social history     Social History     Socioeconomic History     Marital status: Single     Spouse name: Not on file     Number of children: Not on file     Years of education: Not on file     Highest education level: Not on file   Occupational History     Not on file   Social Needs     Financial resource strain: Not on file     Food insecurity     Worry: Not on file     Inability: Not on file     Transportation needs     Medical: Not on file     Non-medical: Not on file   Tobacco Use     Smoking status: Former Smoker     Quit date: 2010     Years since quitting: 10.9     Smokeless tobacco: Never Used   Substance and Sexual Activity     Alcohol use: Yes     Frequency: Monthly or less     Drug use: Not on file      Sexual activity: Not on file   Lifestyle     Physical activity     Days per week: Not on file     Minutes per session: Not on file     Stress: Not on file   Relationships     Social connections     Talks on phone: Not on file     Gets together: Not on file     Attends [employer]ism service: Not on file     Active member of club or organization: Not on file     Attends meetings of clubs or organizations: Not on file     Relationship status: Not on file     Intimate partner violence     Fear of current or ex partner: Not on file     Emotionally abused: Not on file     Physically abused: Not on file     Forced sexual activity: Not on file   Other Topics Concern     Not on file   Social History Narrative     Not on file         Allergy     Amlodipine, Prednisone, and Sulfa drugs    MEDICATIONS List     Current Outpatient Medications   Medication Sig Dispense Refill     aspirin (ASA) 325 MG tablet Take 325 mg by mouth       atorvastatin (LIPITOR) 40 MG tablet TAKE 1 TABLET BY MOUTH EVERYDAY AT BEDTIME       FLUoxetine (PROZAC) 10 MG capsule Take 10 mg by mouth       hydrALAZINE (APRESOLINE) 25 MG tablet Take 25 mg by mouth       levETIRAcetam (KEPPRA) 250 MG tablet Take 250 mg by mouth       lithium ER (LITHOBID) 300 MG CR tablet TAKE 1 TABLET BY MOUTH TWICE A DAY                       Suzanne La MD, MD, PhD  Neurology   Office tel: [phone]        This note was dictated using voice recognition software.  Any grammatical or context distortions are unintentional and inherent to the software. The note is tailored to serve physicians for communications among them, who knows what are the most important elements of history taken for disease diagnosis and differentials as well as management plans. Due to time factors, the notes are in general not reviewed before signing. Again due to time factor, follow-up notes often carries over old notes if they are relevant, so most clinic time is dedicated to interviewing with patients  and caregivers, on clinical assessment, coordinating care and management.

## 2020-12-07 ENCOUNTER — HOME CARE/HOSPICE - HEALTHEAST (OUTPATIENT)
Dept: HOME HEALTH SERVICES | Facility: HOME HEALTH | Age: 80
End: 2020-12-07

## 2020-12-07 ENCOUNTER — RECORDS - HEALTHEAST (OUTPATIENT)
Dept: ADMINISTRATIVE | Facility: OTHER | Age: 80
End: 2020-12-07

## 2020-12-28 ENCOUNTER — RECORDS - HEALTHEAST (OUTPATIENT)
Dept: ADMINISTRATIVE | Facility: OTHER | Age: 80
End: 2020-12-28

## 2021-01-08 ENCOUNTER — RECORDS - HEALTHEAST (OUTPATIENT)
Dept: ADMINISTRATIVE | Facility: OTHER | Age: 81
End: 2021-01-08

## 2021-01-08 ENCOUNTER — HOME CARE/HOSPICE - HEALTHEAST (OUTPATIENT)
Dept: HOME HEALTH SERVICES | Facility: HOME HEALTH | Age: 81
End: 2021-01-08

## 2021-02-03 ENCOUNTER — AMBULATORY - HEALTHEAST (OUTPATIENT)
Dept: NURSING | Facility: CLINIC | Age: 81
End: 2021-02-03

## 2021-02-08 ENCOUNTER — COMMUNICATION - HEALTHEAST (OUTPATIENT)
Dept: INTERNAL MEDICINE | Facility: CLINIC | Age: 81
End: 2021-02-08

## 2021-02-24 ENCOUNTER — AMBULATORY - HEALTHEAST (OUTPATIENT)
Dept: NURSING | Facility: CLINIC | Age: 81
End: 2021-02-24

## 2021-05-24 ENCOUNTER — COMMUNICATION - HEALTHEAST (OUTPATIENT)
Dept: INTERNAL MEDICINE | Facility: CLINIC | Age: 81
End: 2021-05-24

## 2021-05-26 VITALS
RESPIRATION RATE: 17 BRPM | TEMPERATURE: 97.4 F | SYSTOLIC BLOOD PRESSURE: 147 MMHG | OXYGEN SATURATION: 92 % | DIASTOLIC BLOOD PRESSURE: 63 MMHG | HEART RATE: 64 BPM

## 2021-05-26 VITALS
DIASTOLIC BLOOD PRESSURE: 72 MMHG | OXYGEN SATURATION: 94 % | SYSTOLIC BLOOD PRESSURE: 128 MMHG | TEMPERATURE: 97.7 F | HEART RATE: 66 BPM

## 2021-05-26 VITALS
SYSTOLIC BLOOD PRESSURE: 163 MMHG | DIASTOLIC BLOOD PRESSURE: 72 MMHG | OXYGEN SATURATION: 95 % | TEMPERATURE: 96.9 F | HEART RATE: 66 BPM

## 2021-05-27 VITALS
OXYGEN SATURATION: 97 % | DIASTOLIC BLOOD PRESSURE: 64 MMHG | RESPIRATION RATE: 19 BRPM | SYSTOLIC BLOOD PRESSURE: 132 MMHG | HEART RATE: 69 BPM | TEMPERATURE: 97.3 F

## 2021-05-27 VITALS
SYSTOLIC BLOOD PRESSURE: 137 MMHG | DIASTOLIC BLOOD PRESSURE: 64 MMHG | OXYGEN SATURATION: 94 % | TEMPERATURE: 97.3 F | HEART RATE: 76 BPM

## 2021-05-27 VITALS — DIASTOLIC BLOOD PRESSURE: 60 MMHG | OXYGEN SATURATION: 95 % | SYSTOLIC BLOOD PRESSURE: 129 MMHG | HEART RATE: 70 BPM

## 2021-05-27 VITALS — TEMPERATURE: 97.2 F | OXYGEN SATURATION: 93 %

## 2021-05-30 VITALS — BODY MASS INDEX: 23.03 KG/M2 | WEIGHT: 130 LBS

## 2021-05-30 VITALS — HEIGHT: 63 IN | WEIGHT: 130 LBS | BODY MASS INDEX: 23.04 KG/M2

## 2021-05-30 VITALS — WEIGHT: 125 LBS | BODY MASS INDEX: 22.14 KG/M2

## 2021-05-30 VITALS — WEIGHT: 130 LBS | BODY MASS INDEX: 23.03 KG/M2

## 2021-05-30 NOTE — TELEPHONE ENCOUNTER
RN cannot approve Refill Request    RN can NOT refill this medication med is not covered by policy/route to provider. Last office visit: 9/12/2017 Barry Holland MD Last Physical: 7/5/2018 Last MTM visit: Visit date not found Last visit same specialty: 9/12/2017 Barry Holland MD.  Next visit within 3 mo: Visit date not found  Next physical within 3 mo: Visit date not found      Heather Wright, [facility] Triage/Med Refill 7/8/2019    Requested Prescriptions   Pending Prescriptions Disp Refills     lithium (LITHOBID) 300 MG CR tablet [Pharmacy Med Name: LITHIUM CARBONATE  MG TB] 60 tablet 1     Sig: TAKE 1 TABLET BY MOUTH TWICE A DAY       There is no refill protocol information for this order

## 2021-05-30 NOTE — TELEPHONE ENCOUNTER
Please call patient -    ______________________________________________________________________     Home phone:  [phone] (home)     Cell phone:   Telephone Information:   Mobile [phone]       Other contacts:  Name Home Phone Work Phone Mobile Phone Relationship Lgl GrCHUCK Larson [phone]   DAVEY Browning [phone]   Child      ______________________________________________________________________     Her pulmonary function tests (PFTs) do confirm that she has chronic obstructive pulmonary disease (COPD) which would be best characterized as a emphysema type process related to her previous smoking.    We could have her try an inhaler that she could use as needed for this like albuterol if she would like to try this.  She could also try use of a regular inhaler that she takes daily for this.    I am not pushing either because it is uncertain whether this will help or not, but we could try this if willing.    She could be scheduled in the next 3-4 weeks in a reserved slot if she would like to review.  Otherwise, continue to follow up at least yearly.    Barry Holland MD  [facility]  7/25/2019, 9:58 AM      ______________________________________________________________________     PFT Complete Study:  Report     The spirometry was performed with good reproducibility.  The flow volume loop is abnormal.  The concave appearance of the expiratory limb of the flow-volume loop is consistent with an obstructive ventilatory defect     FEV1 is 1.20 L (67% predicted) and is moderately reduced.  FVC is 2.13 L (90% predicted) and is normal.  FEV1/FVC is 57% and is reduced.     There was no improvement in spirometry after a single inhaled dose of bronchodilator.     TLC is 5.19 L (114% predicted) and is normal.  RV is 3.37 L (160% predicted) and is moderately increased.  RV/TLC ratio is 65%, which is 141% predicted and increased.     DLCO is 8.53 mL/min/mmHg (46% predicted) and is  moderately reduced when it is corrected for hemoglobin.     Impression:  This pulmonary function study is abnormal.  Spirometry is consistent with a moderate obstructive ventilatory defect. There was no bronchodilator response. Residual volume and RV/TLC ratio are increased, suggestive of air trapping. Diffusion capacity, when corrected for hemoglobin, is moderately reduced.      Impression completed by:  Kristin Mann MD, MPH  Pulmonary & Critical Care Medicine

## 2021-05-30 NOTE — PROGRESS NOTES
Wt Readings from Last 20 Encounters:   07/12/19 132 lb (59.9 kg)   07/05/18 130 lb (59 kg)   09/12/17 132 lb 9.6 oz (60.1 kg)   04/11/17 130 lb (59 kg)   03/06/17 130 lb (59 kg)   02/24/17 130 lb (59 kg)   01/23/17 125 lb (56.7 kg)   12/23/16 125 lb (56.7 kg)   12/13/16 131 lb (59.4 kg)   11/10/16 122 lb 14.4 oz (55.7 kg)   11/07/16 126 lb 9.6 oz (57.4 kg)   10/04/16 122 lb 12.8 oz (55.7 kg)   07/22/16 126 lb 3.2 oz (57.2 kg)   06/24/16 123 lb 12.8 oz (56.2 kg)   06/09/16 128 lb 6.4 oz (58.2 kg)   05/26/16 125 lb 3.2 oz (56.8 kg)   04/29/16 126 lb 3.2 oz (57.2 kg)   01/25/16 125 lb 3.2 oz (56.8 kg)   03/27/15 135 lb (61.2 kg)   03/23/15 135 lb 11.2 oz (61.6 kg)     She is at risk for lack of exercise and has been provided with information to increase physical activity for the benefit of her well-being.  The patient was counseled and encouraged to consider modifying their diet and eating habits. She was provided with information on recommended healthy diet options.  Patient's advanced directive was discussed and I am comfortable with the patient's wishes.

## 2021-05-30 NOTE — PROGRESS NOTES
RESPIRATORY CARE NOTE     Patient Name: Lizzy Kowalski  Today's Date: 7/23/2019     Complete PFT done. Pt performed tests with good effort. Test results meet ATS criteria. Albuterol 2.5mg given. Results scanned into epic. Pt left in no distress.       ERIC AlcarazT

## 2021-05-31 VITALS — WEIGHT: 132.6 LBS | BODY MASS INDEX: 23.49 KG/M2

## 2021-05-31 NOTE — TELEPHONE ENCOUNTER
RN cannot approve Refill Request    RN can NOT refill this medication med is not covered by policy/route to provider.    López Bruno, [facility] Triage/Med Refill 8/8/2019    Requested Prescriptions   Pending Prescriptions Disp Refills     lithium (LITHOBID) 300 MG CR tablet [Pharmacy Med Name: LITHIUM CARBONATE  MG TB] 60 tablet 0     Sig: TAKE 1 TABLET BY MOUTH TWICE A DAY       There is no refill protocol information for this order

## 2021-05-31 NOTE — TELEPHONE ENCOUNTER
Refill Approved    Rx renewed per Medication Renewal Policy. Medication was last renewed on 1/20/19  OV 7/12/19.    Joelle Colon, [facility] Triage/Med Refill 8/4/2019     Requested Prescriptions   Pending Prescriptions Disp Refills     FLUoxetine (PROZAC) 10 MG capsule [Pharmacy Med Name: FLUOXETINE HCL 10 MG CAPSULE] 90 capsule 1     Sig: TAKE 1 CAPSULE (10 MG TOTAL) BY MOUTH DAILY.       SSRI Refill Protocol  Passed - 8/4/2019 11:34 AM        Passed - PCP or prescribing provider visit in last year     Last office visit with prescriber/PCP: 9/12/2017 Barry Holland MD OR same dept: Visit date not found OR same specialty: 9/12/2017 Barry Holland MD  Last physical: 7/12/2019 Last MTM visit: Visit date not found   Next visit within 3 mo: Visit date not found  Next physical within 3 mo: Visit date not found  Prescriber OR PCP: Barry Holland MD  Last diagnosis associated with med order: 1. Bipolar disorder (H)  - FLUoxetine (PROZAC) 10 MG capsule [Pharmacy Med Name: FLUOXETINE HCL 10 MG CAPSULE]; TAKE 1 CAPSULE (10 MG TOTAL) BY MOUTH DAILY.  Dispense: 90 capsule; Refill: 1    If protocol passes may refill for 12 months if within 3 months of last provider visit (or a total of 15 months).

## 2021-06-01 VITALS — BODY MASS INDEX: 23.92 KG/M2 | WEIGHT: 130 LBS | HEIGHT: 62 IN

## 2021-06-03 VITALS — BODY MASS INDEX: 24.29 KG/M2 | WEIGHT: 132 LBS | HEIGHT: 62 IN

## 2021-06-04 VITALS
DIASTOLIC BLOOD PRESSURE: 84 MMHG | OXYGEN SATURATION: 96 % | HEART RATE: 89 BPM | HEIGHT: 62 IN | BODY MASS INDEX: 24.29 KG/M2 | SYSTOLIC BLOOD PRESSURE: 136 MMHG | WEIGHT: 132 LBS

## 2021-06-08 NOTE — TELEPHONE ENCOUNTER
Informed pt of Dr. Holland's message.    She states an understanding.    Pt scheduled for 7/6/20 at 10:05.    She thanked for the call.  
Please call patient -    ______________________________________________________________________     Home phone:  [phone] (home)     Cell phone:   Telephone Information:   Mobile [phone]       Other contacts:  Name Home Phone Work Phone Mobile Phone Relationship Lgl Fabien JAMESCHUCK [phone]   DAVEY Browning [phone]   Child      ______________________________________________________________________     Did refill of her lithium medication.    She will be due for follow up visit in July.  Could be a virtual visit or FACE TO FACE visit based on patient preference.    Barry Holland MD  [facility]  5/29/2020, 9:41 AM    
123.8

## 2021-06-09 NOTE — PROGRESS NOTES
"ASSESSMENT: Callus on the left fifth toe    PLAN: Callus debrided ×1.  We reviewed options for moisturizing and padding.  Return to clinic if pain persists.      SUBJECTIVE; New patient visit at the [facility] regarding a painful callus on the inside of her left fifth toe where it rubs against the fourth toe.  It is been present for months if not years.  Some relief with a pad between the toes.  Pain with standing and walking and most shoes.  She has not seen bleeding or drainage.    PHYSICAL EXAM:  Visit Vitals     Pulse 76     Ht 5' 3\" (1.6 m)     Wt 130 lb (59 kg)     BMI 23.03 kg/m2     General: Pleasant 76 y.o. female in no acute distress.  Vascular: DP pulses are palpable. PT pulses are palpable. Pedal hair is present. Feet are warm to the touch.  Cardiac: Pulse is regular.  Lymphatic: No edema.  Neuro: Sensation in the feet is grossly intact to light touch.  Derm: Hyperkeratosis at the medial nail border of the left fifth toe where it rubs against the fourth toe.  Callus was debrided down to intact skin.  No evidence of bacterial infection.  Musculoskeletal: Hammertoes, with the left fifth toe tucking under the neighboring fourth toe.    Medical History:  Past Medical History:   Diagnosis Date     Bipolar disorder 2/27/2017     Essential tremor      Former smoker - Quit in 2012      GERD (gastroesophageal reflux disease)      HTN (hypertension)        Surgical History:   has a past surgical history that includes Cervical fusion (1976) and Carpal tunnel release (Right, 01/2017).    Allergies:  Allergies   Allergen Reactions     Amlodipine      Dizzy, headache, shaky after four days.  TBrinkMD - 3/17/15     Prednisone      Sulfa (Sulfonamide Antibiotics)        Medications:  Current Outpatient Prescriptions   Medication Sig Dispense Refill     FLUoxetine (PROZAC) 10 MG capsule Take 1-2 capsules (10-20 mg total) by mouth daily. Please keep this Rx on file for the next RF. 180 capsule 2     lithium " (LITHOBID) 300 MG CR tablet Take 1 tablet (300 mg total) by mouth 2 (two) times a day. 60 tablet 3     No current facility-administered medications for this visit.        Family History:  family history includes Brain cancer in her brother; Diabetes in her mother; Heart disease in her mother; Hyperlipidemia in her daughter; Hypertension in her mother, sister, son, and son; Stomach cancer in her brother; Stroke in her brother and mother.    Social History:  Reviewed, and she  reports that she has quit smoking. She does not have any smokeless tobacco history on file. She reports that she does not drink alcohol or use illicit drugs.    Review of Systems:  A 12 point comprehensive review of systems was negative except as noted.

## 2021-06-09 NOTE — PROGRESS NOTES
Wt Readings from Last 20 Encounters:   07/06/20 132 lb (59.9 kg)   07/12/19 132 lb (59.9 kg)   07/05/18 130 lb (59 kg)   09/12/17 132 lb 9.6 oz (60.1 kg)   04/11/17 130 lb (59 kg)   03/06/17 130 lb (59 kg)   02/24/17 130 lb (59 kg)   01/23/17 125 lb (56.7 kg)   12/23/16 125 lb (56.7 kg)   12/13/16 131 lb (59.4 kg)   11/10/16 122 lb 14.4 oz (55.7 kg)   11/07/16 126 lb 9.6 oz (57.4 kg)   10/04/16 122 lb 12.8 oz (55.7 kg)   07/22/16 126 lb 3.2 oz (57.2 kg)   06/24/16 123 lb 12.8 oz (56.2 kg)   06/09/16 128 lb 6.4 oz (58.2 kg)   05/26/16 125 lb 3.2 oz (56.8 kg)   04/29/16 126 lb 3.2 oz (57.2 kg)   01/25/16 125 lb 3.2 oz (56.8 kg)   03/27/15 135 lb (61.2 kg)

## 2021-06-11 NOTE — TELEPHONE ENCOUNTER
RN cannot approve Refill Request    RN can NOT refill this medication med is not covered by policy/route to provider. Last office visit: 7/6/2020 Barry Holland MD Last Physical: 7/12/2019 Last MTM visit: Visit date not found Last visit same specialty: 7/6/2020 Barry Holland MD.  Next visit within 3 mo: Visit date not found  Next physical within 3 mo: Visit date not found      Gilda Cruz, [facility] Triage/Med Refill 8/30/2020    Requested Prescriptions   Pending Prescriptions Disp Refills     lithium (LITHOBID) 300 MG CR tablet [Pharmacy Med Name: LITHIUM CARBONATE  MG TB] 180 tablet 0     Sig: TAKE 1 TABLET BY MOUTH TWICE A DAY       There is no refill protocol information for this order

## 2021-06-13 NOTE — TELEPHONE ENCOUNTER
Request for Orders    Who's Requesting: Addition of 1 OT Home Care Visit.     Orders being requested: OT  1 visit(s) every 1 week(s) for 1 week(s) for CESIA crossn and AE/CG education.    Where to send Orders: [facility], response through Epic preferred. Please call if you have questions.    Thank you!  Temitope Horn, OTR/L  [phone]

## 2021-06-13 NOTE — TELEPHONE ENCOUNTER
Let patient know that Dr. Holland is out of the office until next week. This is likely something that will need to be discussed with the hospital doctor since Dr. Holland does not round in the hospital.

## 2021-06-13 NOTE — TELEPHONE ENCOUNTER
Called to Damon - she is understanding of this - she has gotten a call from the hospital Dr and has been updated on situation at this time.  Nothing further needed

## 2021-06-13 NOTE — TELEPHONE ENCOUNTER
Home phone number received message phone number is temporarily unavailable.     Attempted to call pt to see if she would like to do video visit (per [employer] message, pt did not read reply).  Left message would call back around 132 if she does not come in for her appointment to see if she would like to do video visit..

## 2021-06-13 NOTE — TELEPHONE ENCOUNTER
Okay to proceed.    Barry Holland MD  General Internal Medicine  [facility]  12/4/2020, 7:41 AM

## 2021-06-13 NOTE — TELEPHONE ENCOUNTER
Who is calling:  Patient daughter Damon Isabel  Reason for Call:  Caller patient daughter states that patient was admitted to [facility] on Monday.  Caller is not able to reach out the hospital doctor to discuss about her mothers treatment that she have concerns. Per caller she talk to a nurse at hospital patient had stroke . Patient is not able to talk . Caller also stated that patient is on lithium she have concerns about it caller requested a call from provider to discuss directly .   Date of last appointment with primary care: 07/06/20  Okay to leave a detailed message: No

## 2021-06-13 NOTE — TELEPHONE ENCOUNTER
Patient Returning Call  Reason for call:  Oumou  Information relayed to patient:    Relayed below Provider information.  Patient has additional questions:  Yes  If YES, what are your questions/concerns:    Oumou states Friday, 12/3/2020 at 1:30 is fine.  Oumou is questioning if this appointment is an in office visit or virtual.  Please reach out to Oumou and advise.  Thank you.  Okay to leave a detailed message?: Yes

## 2021-06-13 NOTE — TELEPHONE ENCOUNTER
Daughter Oumou called in and would like a call back as soon as possible as she feels her mother's blood pressure medication is causing some issues. She also reports that Lizzy is not eating or drinking nearly enough and she is lethargic.

## 2021-06-13 NOTE — PROGRESS NOTES
"TCM DISCHARGE FOLLOW UP CALL      \"Hi, my name is  Sherie, and I am calling from [facility].  I am calling to follow up and see how things are going for you after your recent hospitalization.      Tell me how you are doing now that you are home?\"  - Not eating and is not drinking enough, otherwise she is doing okay. Kelli Jose states that she plans to contact home care today to confirm they will be coming out to the home soon.      Discharge Instructions     Do you understand your discharge instructions?  Pt. Response:  Yes, Damon reports that they plan to contact Neurology and the Vascular [facility].      \"Has an appointment with your primary care provider been scheduled?\"  Yes    \"If yes, when is that appointment?    Monday 12/7 at 9:40am    If no, date of appointment scheduled: NA       Medications    \"Did you have any medication changes?   Yes     Do you have any concerns with obtaining the medications or questions about your medications that you would like a RN to review with you?  No      \"When you see the provider, I would recommend that you bring your medications with you.\"      Call Summary    \"What questions or concerns do you have about your recent visit and your follow-up care?\"  I would like the phone number for both [facility] and [facility] (provided by [facility] to kelli Jose). No other concerns or questions right now.              - RN Triage Number is [phone] if patient needs to be immediatly transferred -         \"If you have questions or things don't continue to improve, we encourage you contact us through the main clinic number at [phone].  Even if the clinic is not open, triage nurses are available 24/7 to help you.      The Clinic Community Health Worker spoke with the patient today due to ED/Hospital Discharge to discuss possible Clinic Care Coordination enrollment.  The service was described to the patient and immediate needs were discussed.  The patient declined enrollment at this " time.  The PCP is encouraged to refer in the future if the patient's needs change.

## 2021-06-13 NOTE — TELEPHONE ENCOUNTER
Left message to call back for: arvind  Information to relay to patient: please see below and relay message from provider

## 2021-06-13 NOTE — TELEPHONE ENCOUNTER
See additional encounter from 12/01/2020    Called to Oumou - pt is having issues with weakness in her legs. Pt was in Hospital with a stroke and Oumou feels like things are worse.  Pt is drooling today but was not yesterday.  Thinks that BP is really low (137/64 per Oumou this is really low for pt) - Oumou is thinking that the patient is really declining.  Pt was able to walk fine yesterday and today she is very weak and dragging the right leg.      Oumou is very concerned.        Pt has appt scheduled for 12/07/2020         hydrALAZINE (APRESOLINE) 25 MG tablet 90 tablet 0 11/30/2020  No   Sig - Route: Take 1 tablet (25 mg total) by mouth every 8 (eight) hours. - Oral   Sent to pharmacy as: hydrALAZINE 25 mg tablet (APRESOLINE)   E-Prescribing Status: Receipt confirmed by pharmacy (11/30/2020 12:17 PM CST)

## 2021-06-13 NOTE — PROGRESS NOTES
HOME HEALTH MISSED VISIT NOTIFICATION (FYI)       Your patient who receives home health services missed a visit on: 12/4/2020         Type of service missed: PT      Reason for missed visit (pick applicable reason):           Patient/Family refused (explain): Bringing Pt in for medical evaluation, requested all rest of visits be cancelled this date.

## 2021-06-13 NOTE — TELEPHONE ENCOUNTER
Okay to proceed.    Barry Holland MD  General Internal Medicine  [facility]  12/4/2020, 5:14 PM

## 2021-06-13 NOTE — TELEPHONE ENCOUNTER
Daughter is very upset because no one has been getting a hold of her to her know what to do.    Per Daughter pt is going to [facility] because she can not move half her body and is unable to talk.  They double checked with [facility] to be sure that a family member would be able to go in the hospital with her.    Lizzy Kowalski is not going to a [facility] because they will not allow family members into the hospital even if pt can not speak for herself or do anything, she states Dr. Rondon put a note in her chart saying pt would benefit from having a family member with her.    She states that Lizzy Kowalski had a stroke and was told by neurology that she should just stay home because there is nothing that will be able to be done anyway.    They have redone the house with grab bars and rearranged things to be able to keep her home, but is unsure of what to do Lizzy Kowalski was active and able to do things on her own but now she can not do anything.    Appointment for 12/4/20 with Dr. Holland has been canceled.

## 2021-06-13 NOTE — TELEPHONE ENCOUNTER
Request for SN orders:   2/week for 1 week then  1/week for 2 weeks for meds management teaching, cardiopulmonary assessment.

## 2021-06-13 NOTE — TELEPHONE ENCOUNTER
Request for Orders    Who's Requesting: Home Care Occupational Therapist    Orders being requested: OT  1 visit(s) every 1 week(s) for 1 week(s)  3 visit(s) every 1 week(s) for 4 week(s) for CESIA fxn, ADL retraining, compensatory strategies, CG education.    Where to send Orders: [facility], response through Epic preferred. Please call if you have questions.    Thank you!  Temitope Horn, OTR/L  [phone]

## 2021-06-13 NOTE — TELEPHONE ENCOUNTER
Okay to proceed as noted.    Barry Holland MD  General Internal Medicine  [facility]  12/1/2020, 3:13 PM

## 2021-06-13 NOTE — TELEPHONE ENCOUNTER
Call them back -    They will need to go back into the hospital especially if she is worsening.    She should stop the lithium as the other new medications might be interacting with the lithium.  She should continue on the other medications.    We could move her follow up to Friday at 1:30 pm if she would wish rather than next week.    Thank you,    Barry Holland MD  General Internal Medicine  [facility]  12/2/2020, 9:34 PM

## 2021-06-13 NOTE — TELEPHONE ENCOUNTER
Request for Orders    Who s Requesting: Home Care Physical Therapist    Orders being requested: SOC completed on 12/1/2020 + ongoing PT at frequency 2w3 for strengthening HEP, safety with transfers, gait and balance training  OT eval with emphasis on maximizing R UE function s/p CVA, cognition as indicated, shower safety assessment, ADLs  SLP eval with emphasis on aphasia and dysphagia s/p CVA  SN eval with emphasis on medications, disease management and monitoring, diet/nutrition  MSW eval with emphasis on community resources, assist with HCD/POA as able, long-term planning to assist daughter as caregiver     Pt's daughter declined HHA for now but will reconsider based on OT's recommendations after their evaluation.    Where to send Orders: please reply ok to this encounter asap    Also: 1 severe interaction between losartan and lithium was identified at SOC. Please respond within 24 hours with further direction or ok to continue meds as prescribed.

## 2021-06-13 NOTE — TELEPHONE ENCOUNTER
Requesting new verbal order for Physical Therapy start of care. Original order for Skilled Nursing and Physical Therapy. Patient will be seen 12/1 or 12/2.     Requesting that Physical therapy go out first.

## 2021-06-17 NOTE — PATIENT INSTRUCTIONS - HE
Patient Instructions by Barry Holland MD at 7/12/2019  3:30 PM     Author: Barry Holland MD Service: -- Author Type: Physician    Filed: 7/13/2019  3:34 PM Encounter Date: 7/12/2019 Status: Addendum    : Barry Holland MD (Physician)    Related Notes: Original Note by Barry Holland MD (Physician) filed at 7/12/2019 10:59 PM       Please follow up if you have any further issues.    You may contact me by phone or MyChart if you are worsening or if things are not improving.    Thank you for coming in today.      Patient Education     Exercise for a Healthier Heart  You may wonder how you can improve the health of your heart. If youre thinking about exercise, youre on the right track. You dont need to become an athlete, but you do need a certain amount of brisk exercise to help strengthen your heart. If you have been diagnosed with a heart condition, your doctor may recommend exercise to help stabilize your condition. To help make exercise a habit, choose safe, fun activities.       Be sure to check with your health care provider before starting an exercise program.    Why exercise?  Exercising regularly offers many healthy rewards. It can help you do all of the following:    Improve your blood cholesterol levels to help prevent further heart trouble    Lower your blood pressure to help prevent a stroke or heart attack    Control diabetes, or reduce your risk of getting this disease    Improve your heart and lung function    Reach and maintain a healthy weight    Make your muscles stronger and more limber so you can stay active    Prevent falls and fractures by slowing the loss of bone mass (osteoporosis)    Manage stress better  Exercise tips  Ease into your routine. Set small goals. Then build on them.  Exercise on most days. Aim for a total of 150 or more minutes of moderate to  vigorous intensity activity each week. Consider 40 minutes, 3 to 4 times a week. For best results, activity should last  for 40 minutes on average. It is OK to work up to the 40 minute period over time. Examples of moderate-intensity activity is walking one mile in 15 minutes or 30 to 45 minutes of yard work.  Step up your daily activity level. Along with your exercise program, try being more active throughout the day. Walk instead of drive. Do more household tasks or yard work.  Choose one or more activities you enjoy. Walking is one of the easiest things you can do. You can also try swimming, riding a bike, or taking an exercise class.  Stop exercising and call your doctor if you:    Have chest pain or feel dizzy or lightheaded    Feel burning, tightness, pressure, or heaviness in your chest, neck, shoulders, back, or arms    Have unusual shortness of breath    Have increased joint or muscle pain    Have palpitations or an irregular heartbeat      3031-9041 [employer]. [address] [place], [place] [address]. All rights reserved. This information is not intended as a substitute for professional medical care. Always follow your healthcare professional's instructions.         Patient Education   Understanding USDA MyPlate  The USDA (US Department of Agriculture) has guidelines to help you make healthy food choices. These are called MyPlate. MyPlate shows the food groups that make up healthy meals using the image of a place setting. Before you eat, think about the healthiest choices for what to put onto your plate or into your cup or bowl. To learn more about building a healthy plate, visit www.choosemyplate.gov.       The Food Groups    Fruits: Any fruit or 100% fruit juice counts as part of the Fruit Group. Fruits may be fresh, canned, frozen, or dried, and may be whole, cut-up, or pureed. Make half your plate fruits and vegetables.    Vegetables: Any vegetable or 100% vegetable juice counts as a member of the Vegetable Group. Vegetables may be fresh, frozen, canned, or dried. They can be served raw or cooked and may  be whole, cut-up, or mashed. Make half your plate fruits and vegetables.     Grains: All foods made from grains are part of the Grains Group. These include wheat, rice, oats, cornmeal, and barley such as bread, pasta, oatmeal, cereal, tortillas, and grits. Grains should be no more than a quarter of your plate. At least half of your grains should be whole grains.    Protein: This group includes meat, poultry, seafood, beans and peas, eggs, processed soy products (like tofu), nuts (including nut butters), and seeds. Make protein choices no more than a quarter of your plate. Meat and poultry choices should be lean or low fat.    Dairy: All fluid milk products and foods made from milk that contain calcium, like yogurt and cheese are part of the Dairy Group. (Foods that have little calcium, such as cream, butter, and cream cheese, are not part of the group.) Most dairy choices should be low-fat or fat-free.    Oils: These are fats that are liquid at room temperature. They include canola, corn, olive, soybean, and sunflower oil. Foods that are mainly oil include mayonnaise, certain salad dressings, and soft margarines. You should have only 5 to 7 teaspoons of oils a day. You probably already get this much from the food you eat.  Use [employer] to Help Build Your Meals  The SuperTracker can help you plan and track your meals and activity. You can look up individual foods to see or compare their nutritional value. You can get guidelines for what and how much you should eat. You can compare your food choices. And you can assess personal physical activities and see ways you can improve. Go to www.BrightContextplate.gov/supertracker/.    3282-2321 [employer]. [address] [place] [address], [place] [address]. All rights reserved. This information is not intended as a substitute for professional medical care. Always follow your healthcare professional's instructions.             Advance Directive  Patients advance directive  was discussed and I am comfortable with the patients wishes.  Patient Education   Personalized Prevention Plan  You are due for the preventive services outlined below.  Your care team is available to assist you in scheduling these services.  If you have already completed any of these items, please share that information with your care team to update in your medical record.  Health Maintenance   Topic Date Due   ? ZOSTER VACCINES (2 of 2) 06/06/2017   ? FALL RISK ASSESSMENT  07/12/2020   ? ADVANCE DIRECTIVES DISCUSSED WITH PATIENT  07/05/2023   ? TD 18+ HE  04/11/2027   ? DXA SCAN  Addressed   ? PNEUMOCOCCAL POLYSACCHARIDE VACCINE AGE 65 AND OVER  Addressed   ? PNEUMOCOCCAL CONJUGATE VACCINE FOR ADULTS (PCV13 OR PREVNAR)  Addressed   ? INFLUENZA VACCINE RULE BASED  Discontinued

## 2021-06-17 NOTE — TELEPHONE ENCOUNTER
Telephone Encounter by Barry Holland MD at 12/1/2020 10:23 PM     Author: Barry Holland MD Service: -- Author Type: Physician    Filed: 12/1/2020 10:23 PM Encounter Date: 12/1/2020 Status: Signed    : Barry Holland MD (Physician)       Okay to proceed with home care orders as noted.    Aware of interaction and continue medications at this time.  Will check lithium level at the next visit.       Barry Holland MD  General Internal Medicine  [facility]

## 2021-06-19 NOTE — LETTER
Letter by Barry Holland MD at      Author: Barry Holland MD Service: -- Author Type: --    Filed:  Encounter Date: 5/13/2019 Status: (Other)       Lizzy MOLINA [facility] [place]   [address]  [address] [place] [id number]         Dear Lizzy,    I am sending you this letter to remind you that you are due for a follow up visit in July.    Please call to schedule this visit as soon as possible as our schedule fills up quickly.    If you already have an appointment scheduled with me for around this time, please ignore this notification.    I look forward to seeing you soon.      If you have any questions regarding the above, please feel free to contact me at [phone].        Sincerely,    Barry Holland MD  General Internal Medicine  [facility]

## 2021-06-25 NOTE — PROGRESS NOTES
Progress Notes by Barry Holland MD at 2/24/2017  2:40 PM     Author: Barry Holland MD Service: -- Author Type: Physician    Filed: 2/27/2017  2:08 PM Encounter Date: 2/24/2017 Status: Signed    : Barry Holland MD (Physician)              [facility]    Comprehensive and complex medical care - Chronic disease management - Shared decision making - Care coordination - Compassionate care    Patient advocacy - Rational deprescribing - Minimally disruptive medicine - Ethical focus - Customized care    Date of Service: 2/24/2017  Primary Provider: Barry Holland MD    Patient Care Team:  Barry Holland MD as PCP - General (Internal Medicine)     ______________________________________________________________________     Patient's Pharmacy:    [facility] [id number] [facility] - [place], [place] - [address] [place] [address]  Phone: [phone] Fax: [phone]     Patient's Insurance:    Payor: ARE / Plan: [facility] / Product Type: MEDICARE ADVANTAGE /     ______________________________________________________________________     Lizzy Kowalski is 76 y.o. female who comes in today for:    Chief Complaint   Patient presents with   ? Follow-up       Patient Active Problem List   Diagnosis   ? HTN (hypertension)   ? Essential tremor   ? GERD (gastroesophageal reflux disease)   ? Former smoker - Quit in 2012   ? Anxiety   ? Dysthymia   ? Bipolar disorder     Current Outpatient Prescriptions   Medication Sig   ? FLUoxetine (PROZAC) 10 MG capsule Take 1-2 capsules (10-20 mg total) by mouth daily. Please keep this Rx on file for the next RF.   ? lithium (LITHOBID) 300 MG CR tablet Take 1 tablet (300 mg total) by mouth 2 (two) times a day.     Social History     Social History Narrative        Lives in a senior apartment.  Has a Shipoo dog.  2 daughters who are involved in her care.      ______________________________________________________________________     History of present illness:    Patient comes in today with her daughter.    Mainly into follow-up on her mood.  She is spending a good amount of money at this time.  Apparently in the last 2 weeks, she has spent $2000 when she has only 30,000 in her life savings.  Her daughters are concerned about this.  Patient does not seem to be concerned.  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  She does go to the [employer] with them at times.  She's had a recent history of being very active and having a lot of energy, and then crashing into a depression for a period of time.  We discussed with her daughter the likely diagnosis of bipolar disorder.  She, the patient, does not say why the change her medications at this time.  She was not able to tolerate 20 mg of fluoxetine as it made her too tired.  She is taking 10 mg regularly at this time.    She has had some similar symptoms earlier on in her life, but it's become more accentuated as she is gotten older-in the last 2 years especially.    On review of systems, the patient denies any chest pain or shortness of breath.  No sweats.    ______________________________________________________________________    Exam:    Wt Readings from Last 3 Encounters:   02/24/17 130 lb (59 kg)   01/23/17 125 lb (56.7 kg)   12/23/16 125 lb (56.7 kg)     BP Readings from Last 3 Encounters:   02/24/17 134/76   01/23/17 134/76   12/23/16 136/80     Visit Vitals   ? /76   ? Pulse 87   ? Temp 98.3  F (36.8  C) (Oral)   ? Wt 130 lb (59 kg)   ? SpO2 98%   ? BMI 23.03 kg/m2      The patient is comfortable, no acute distress.  Mood good.  Speech is pressured.  Insight good.  Eyes are nonicteric.  Neck is supple without mass.  No cervical adenopathy.  No thyromegaly. Heart regular rate and rhythm.  Lungs clear to auscultation bilaterally.   Respiratory effort is good.  Abdomen soft and nontender.  No hepatosplenomegaly.  Extremities no edema.      ______________________________________________________________________    Diagnostics:    Results for orders placed or performed in visit on 12/13/16   Hemoglobin   Result Value Ref Range    Hemoglobin 14.6 12.0 - 16.0 g/dL   Electrocardiogram Perform and Read   Result Value Ref Range    SYSTOLIC BLOOD PRESSURE  mmHg    DIASTOLIC BLOOD PRESSURE  mmHg    VENTRICULAR RATE 72 BPM    ATRIAL RATE 72 BPM    P-R INTERVAL 128 ms    QRS DURATION 76 ms    Q-T INTERVAL 398 ms    QTC CALCULATION (BEZET) 435 ms    P Axis 70 degrees    R AXIS 56 degrees    T AXIS 36 degrees    MUSE DIAGNOSIS       Normal sinus rhythm  Possible Left atrial enlargement  Borderline ECG  No previous ECGs available  Confirmed by CASEY LERMA MD LOC:[facility] ([id number]) on 12/13/2016 2:00:48 PM        ______________________________________________________________________    Assessment:    1. Bipolar disorder       ______________________________________________________________________      PHQ-2 Total Score: 3 (12/23/2016 10:00 AM)  Depression Follow-up Plan: patient follow-up to return when and if necessary (12/23/2016 10:00 AM)  PHQ-9 Total Score: 8 (12/23/2016 10:00 AM)    Plan:    1. I think her symptom complex is best characterized as bipolar disorder, not anxiety nor depression.  2. She will continue fluoxetine 10 mg at this time but then start lithium 300 mg CR twice a day.  3. They will contact us if she needs a follow up sooner, otherwise in 6-7 weeks.  4. Consider other medication for her blood pressure in the future.    25 minutes or greater were spent with the patient today with greater than 50% of the times spent in counseling and coordination of care.     Barry Holland MD  General Internal Medicine  [facility]     Return in about 7 weeks (around 4/11/2017) for visit and blood work.

## 2021-06-25 NOTE — PROGRESS NOTES
Progress Notes by Barry Holland MD at 1/23/2017 11:20 AM     Author: Barry Holland MD Service: -- Author Type: Physician    Filed: 1/23/2017 12:32 PM Encounter Date: 1/23/2017 Status: Signed    : Barry Holland MD (Physician)              [facility]    Comprehensive and complex medical care - Chronic disease management - Shared decision making - Care coordination - Compassionate care    Date of Service: 1/23/2017  Primary Provider: Barry Holland MD    Patient Care Team:  Barry Holland MD as PCP - General (Internal Medicine)     ______________________________________________________________________     Patient's Pharmacy:    [facility] [id number] [facility] - [place], [place] - [address] [place] [address]  Phone: [phone] Fax: [phone]     Patient's Insurance:    Payor: [facility] / Plan: [facility] / Product Type: MEDICARE ADVANTAGE /     ______________________________________________________________________     Lizzy Kowalski is a 76 y.o. female who comes in today for:    Chief Complaint   Patient presents with   ? Follow-up       Current Outpatient Prescriptions   Medication Sig   ? FLUoxetine (PROZAC) 10 MG capsule Take 1-2 capsules (10-20 mg total) by mouth daily. Please keep this Rx on file for the next RF.     ______________________________________________________________________     History of present illness:    The patient comes in today with her daughter.    Her right carpal tunnel surgery went well.  She is unhappy with the results.  She denies any numbness in her hand anymore.    We reviewed her anxiety.  She feels that she was too tired and lazy on the 20 mg of the fluoxetine, so has cut it back to 10 mg at this time.  Her daughter says that after last visit she did get more depressed, but did improve.  We talked about different options in relationship to treatment for this.  They would like to  continue to use the fluoxetine if possible.  She was under stress with the upcoming surgery and with some financial issues for her apartment.    On review of systems, the patient has no symptoms of gardenia.    ______________________________________________________________________      Exam:    Wt Readings from Last 3 Encounters:   01/23/17 125 lb (56.7 kg)   12/23/16 125 lb (56.7 kg)   12/13/16 131 lb (59.4 kg)     BP Readings from Last 3 Encounters:   01/23/17 134/76   12/23/16 136/80   12/13/16 140/70      Visit Vitals   ? /76   ? Pulse 62   ? Wt 125 lb (56.7 kg)   ? SpO2 98%   ? BMI 22.14 kg/m2      In general, the patient is comfortable, no apparent distress.  Mood good.  Insight good.  Heart regular rate and rhythm.  Lungs clear to auscultation bilaterally.  No tremor.  No thyromegaly.  Her right hand incision is healing well.    ______________________________________________________________________     Assessment:    1. Anxiety        ______________________________________________________________________     Quality review:    Health Maintenance Due   Topic Date Due   ? Depression Follow Up HE  1940   ? TD 18+ HE  04/05/1958   ? ADVANCE DIRECTIVES DISCUSSED WITH PATIENT  04/05/1958   ? ZOSTER VACCINE  04/05/2000   ? PNEUMOCOCCAL POLYSACCHARIDE VACCINE AGE 65 AND OVER  04/05/2005   ? PNEUMOCOCCAL CONJUGATE VACCINE FOR ADULTS (PCV13 OR PREVNAR)  04/05/2005       History   Smoking Status   ? Former Smoker   Smokeless Tobacco   ? Not on file     Comment: Quit in 2013        PHQ-2 Total Score: 3 (12/23/2016 10:00 AM)  Depression Follow-up Plan: patient follow-up to return when and if necessary (12/23/2016 10:00 AM)  PHQ-9 Total Score: 8 (12/23/2016 10:00 AM)  ______________________________________________________________________       Plan:    1. Continue on 10 mg of fluoxetine at this time.  2. If not doing well, alternate 10 mg with 20 mg every other day.  3. Follow-up again in 2-3 months.    Barry  Koko Holland MD  General Internal Medicine  [facility]    Return in about 3 months (around 4/23/2017).

## 2021-06-25 NOTE — PROGRESS NOTES
Progress Notes by Barry Holland MD at 4/11/2017  2:15 PM     Author: Barry Holland MD Service: -- Author Type: Physician    Filed: 4/11/2017 10:03 PM Encounter Date: 4/11/2017 Status: Signed    : Barry Holland MD (Physician)              [facility]    Comprehensive and complex medical care - Chronic disease management - Shared decision making - Care coordination - Compassionate care    Patient advocacy - Rational deprescribing - Minimally disruptive medicine - Ethical focus - Customized care    Date of Service: 4/11/2017  Primary Provider: Barry Holland MD    Patient Care Team:  Barry Holland MD as PCP - General (Internal Medicine)     ______________________________________________________________________     Patient's Pharmacy:    [facility] [id number] [facility] - [place], [place] - [address] [place] [address]  Phone: [phone] Fax: [phone]     Patient's Insurance:    Payor: ARE / Plan: [facility] / Product Type: MEDICARE ADVANTAGE /     ______________________________________________________________________     Lizzy Kowalski is 77 y.o. female who comes in today for:    Chief Complaint   Patient presents with   ? Follow-up     2-3 month FU, lithium       Patient Active Problem List   Diagnosis   ? HTN (hypertension)   ? Essential tremor   ? GERD (gastroesophageal reflux disease)   ? Former smoker - Quit in 2012   ? Bipolar disorder     Current Outpatient Prescriptions   Medication Sig   ? FLUoxetine (PROZAC) 10 MG capsule Take 1-2 capsules (10-20 mg total) by mouth daily. Please keep this Rx on file for the next RF.   ? lithium (LITHOBID) 300 MG CR tablet Take 1 tablet (300 mg total) by mouth 2 (two) times a day.     Social History     Social History Narrative        Lives in a senior apartment.  Has a Shipoo dog.  2 daughters who are involved in her care.      ______________________________________________________________________     History of present illness:    The patient comes in today by herself.  She does not come in with a family member.    We first reviewed her bipolar disorder.  Her family feels she is doing better.  She is not really having the emotional extremes she was previously.  She was started on lithium at the last visit and is tolerating it well.  She has no concerns about it.    She continues on fluoxetine 10 mg today and has no issues with this.  She still has a good supply of it.    We reviewed her blood pressure issues and this is been doing well.  It seems like getting her anxiety and bipolar disorder in control has seemed to help this.    Her essential tremor is stable at this time without issues.    On review of systems, the patient denies any chest pain or shortness of breath.    ______________________________________________________________________    Exam:    Wt Readings from Last 3 Encounters:   04/11/17 130 lb (59 kg)   03/06/17 130 lb (59 kg)   02/24/17 130 lb (59 kg)     BP Readings from Last 3 Encounters:   04/11/17 126/64   02/24/17 134/76   01/23/17 134/76     /64  Pulse 68  Wt 130 lb (59 kg)  BMI 23.03 kg/m2   The patient is comfortable, no acute distress.  Mood good.  Insight good.  Eyes are nonicteric.  Neck is supple without mass.  No cervical adenopathy.  No thyromegaly. Heart regular rate and rhythm.  Lungs clear to auscultation bilaterally.  Respiratory effort is good.  Extremities no edema.      ______________________________________________________________________    Diagnostics:    Results for orders placed or performed in visit on 04/11/17   Lithium   Result Value Ref Range    Lithium 0.6 0.6 - 1.2 mmol/L   Basic Metabolic Panel   Result Value Ref Range    Sodium 139 136 - 145 mmol/L    Potassium 4.3 3.5 - 5.0 mmol/L    Chloride 104 98 - 107 mmol/L    CO2 26 22 - 31 mmol/L    Anion Gap, Calculation 9 5 - 18  mmol/L    Glucose 82 70 - 125 mg/dL    Calcium 9.6 8.5 - 10.5 mg/dL    BUN 14 8 - 28 mg/dL    Creatinine 0.81 0.60 - 1.10 mg/dL    GFR MDRD Af Amer >60 >60 mL/min/1.73m2    GFR MDRD Non Af Amer >60 >60 mL/min/1.73m2      ______________________________________________________________________    Assessment:    1. Bipolar disorder    2. HTN (hypertension)    3. Essential tremor    4. Immunization refused       ______________________________________________________________________      No results found for: LDLCALC   The ASCVD Risk score (Marvni ELROY Jr, et al., 2013) failed to calculate for the following reasons:    Cannot find a previous HDL lab    Cannot find a previous total cholesterol lab     If the patient is currently on cholesterol medication, this calculation is likely an underestimate of risk.    PHQ-2 Total Score: 2 (4/11/2017  9:00 PM)  Depression Follow-up Plan: patient follow-up to return when and if necessary (4/11/2017  9:00 PM)  PHQ-9 Total Score: 3 (4/11/2017  9:00 PM)    Plan:    1. Patient will continue on lithium at this time.  2. She did not see psychiatry but we will consider this in the future if not improving.  3. Follow-up again in 6 months and do pH Q9 at that time.  4. She declines all immunizations and has done so consistently.  She doesn't want this addressed again.  She declines DEXA scan as she would not take medication even if her bone density was low.  5. Similarly, she is not interested in cholesterol medication.  Lipid level will not be drawn.     Barry Holland MD  General Internal Medicine  [facility]     Return in about 6 months (around 10/11/2017) for follow up visit.

## 2021-06-25 NOTE — PROGRESS NOTES
Progress Notes by Barry Holland MD at 9/12/2017  1:00 PM     Author: Barry Holland MD Service: -- Author Type: Physician    Filed: 9/12/2017  1:44 PM Encounter Date: 9/12/2017 Status: Signed    : Barry Holland MD (Physician)              [facility]    Comprehensive and complex medical care - Chronic disease management - Shared decision making - Care coordination - Compassionate care    Patient advocacy - Rational deprescribing - Minimally disruptive medicine - Ethical focus - Customized care    Date of Service: 9/12/2017  Primary Provider: Barry Holland MD    Patient Care Team:  Barry Holland MD as PCP - General (Internal Medicine)     ______________________________________________________________________     Patient's Pharmacy:    [facility] [id number] [facility] - [place], [place] - [address] [place] [address]  Phone: [phone] Fax: [phone]     Patient's Insurance:    Payor: [facility] / Plan: [facility] / Product Type: MEDICARE ADVANTAGE /     ______________________________________________________________________     Lizzy Kowalski is 77 y.o. female who comes in today for:    Chief Complaint   Patient presents with   ? Follow-up     F/U for lithium        Patient Active Problem List   Diagnosis   ? HTN (hypertension)   ? Essential tremor   ? GERD (gastroesophageal reflux disease)   ? Former smoker - Quit in 2012   ? Bipolar disorder     Current Outpatient Prescriptions   Medication Sig   ? FLUoxetine (PROZAC) 10 MG capsule Take 1-2 capsules (10-20 mg total) by mouth daily. Please keep this Rx on file for the next RF.   ? lithium (LITHOBID) 300 MG CR tablet Take 1 tablet (300 mg total) by mouth 2 (two) times a day. Please keep this Rx on file for the next RF.     Social History     Social History Narrative        Lives in a senior apartment.  Has a Shipoo dog.  2 daughters who are involved in her care.      ______________________________________________________________________     History of present illness:    Reviewed her bipolar disorder.  She is doing well with lithium.  She still takes 10 mg of fluoxetine a day as well.  Her PHQ 9 score was good today.    Reviewed her high blood pressure and her blood pressure is under good control.    Reviewed her essential tremor and this is no worse for her.  She has no concerns.    On review of systems, the patient denies any chest pain or shortness of breath.    ______________________________________________________________________    Exam:    Wt Readings from Last 3 Encounters:   09/12/17 132 lb 9.6 oz (60.1 kg)   04/11/17 130 lb (59 kg)   03/06/17 130 lb (59 kg)     BP Readings from Last 3 Encounters:   04/11/17 126/64   02/24/17 134/76   01/23/17 134/76     Pulse 64  Wt 132 lb 9.6 oz (60.1 kg)  SpO2 93%  BMI 23.49 kg/m2   The patient is comfortable, no acute distress.  Mood good.  Insight good.  Eyes are nonicteric.  Neck is supple without mass.  No cervical adenopathy.  No thyromegaly. Heart regular rate and rhythm.  Lungs clear to auscultation bilaterally.  Respiratory effort is good.  Abdomen soft and nontender.  No hepatosplenomegaly.  Extremities no edema.      ______________________________________________________________________    Diagnostics:    Results for orders placed or performed in visit on 04/11/17   Lithium   Result Value Ref Range    Lithium 0.6 0.6 - 1.2 mmol/L   Basic Metabolic Panel   Result Value Ref Range    Sodium 139 136 - 145 mmol/L    Potassium 4.3 3.5 - 5.0 mmol/L    Chloride 104 98 - 107 mmol/L    CO2 26 22 - 31 mmol/L    Anion Gap, Calculation 9 5 - 18 mmol/L    Glucose 82 70 - 125 mg/dL    Calcium 9.6 8.5 - 10.5 mg/dL    BUN 14 8 - 28 mg/dL    Creatinine 0.81 0.60 - 1.10 mg/dL    GFR MDRD Af Amer >60 >60 mL/min/1.73m2    GFR MDRD Non Af Amer >60 >60 mL/min/1.73m2       ______________________________________________________________________    Assessment:    1. Bipolar disorder    2. HTN (hypertension)    3. Essential tremor       ______________________________________________________________________      PHQ-2 Total Score: 0 (9/12/2017  1:40 PM)  Depression Follow-up Plan: patient follow-up to return when and if necessary (4/11/2017  9:00 PM)  PHQ-9 Total Score: 0 (9/12/2017  1:40 PM)    Plan:    1. Check blood work today.   2. Continue lithium + FLUOXETINE.  3. Follow up at least yearly.  4. Review code status with the patient in the future.     Barry Holland MD  General Internal Medicine  [facility]     Return in about 1 year (around 9/12/2018) for visit and blood work.

## 2021-06-26 NOTE — PROGRESS NOTES
Progress Notes by Barry Holland MD at 7/5/2018  4:10 PM     Author: Barry Holland MD Service: -- Author Type: Physician    Filed: 7/5/2018  7:06 PM Encounter Date: 7/5/2018 Status: Signed    : Barry Holland MD (Physician)              [facility]    Comprehensive and complex medical care - Chronic disease management - Shared decision making - Care coordination - Compassionate care    Patient advocacy - Rational deprescribing - Minimally disruptive medicine - Ethical focus - Customized care    ______________________________________________________________________     Date of Service: 7/5/2018  Primary Provider: Barry Holland MD    Patient Care Team:  Barry Holland MD as PCP - General (Internal Medicine)  Shamir John MD as Physician (Orthopedic Surgery)     ______________________________________________________________________     Patient's Pharmacy:    [facility] [id number] [facility] - [place], [place] - [address]  [facility] [address]  [place] [address]  Phone: [phone] Fax: [phone]     Patient's Contacts:  Name Home Phone Work Phone Mobile Phone Relationship Lg Fabien   CHUCK CAZARES [phone]   Child    DAVEY CERVANTES [phone]   Child      Patient's Insurance:    Payor: [employer] / Plan: [employer] / Product Type: MEDICARE ADVANTAGE /     ______________________________________________________________________     Preoperative examination    Lizzy Kowalski is a 78 y.o. female (1940) who I am asked to see by her surgeon regarding her fitness for upcoming surgery.  LMP:  No LMP recorded. Patient is postmenopausal.    Chief Complaint   Patient presents with   ? Pre-op Exam     CLOSED RIDUCTION PINNING DISTAL NIDHILIN LEONEL LAZO, Dr. Seaman, [facility], 7/6/18, [phone]     ______________________________________________________________________    History of present illness:    Patient comes in today for preoperative  cardiovascular evaluation prior to planned ORIF of the right wrist by Dr. John.  She slipped on her floor on Sunday and fell backwards.  She felt a fracture right away.  She went into [facility] and was diagnosed with a fracture.  Apparently it is fractured and  areas.  She will be under a general anesthetic and conscious sedation for the procedure.    Reviewed her bipolar disorder and this is doing well without issue.  She remains on lithium and fluoxetine at this time.    We reviewed her high blood pressure and her blood pressure is doing well without issue.  Reviewed her essential tremor and this is no worse than previous.    Reviewed CODE STATUS with her.  She wishes to be full CODE STATUS at this time.  Her daughter Oumou would speak for her if she could not speak for herself.    ______________________________________________________________________     Patient Active Problem List   Diagnosis   ? HTN (hypertension)   ? Essential tremor   ? GERD (gastroesophageal reflux disease)   ? Former smoker - Quit in 2012   ? Bipolar disorder (H)   ? Full code status     Past Surgical History:   Procedure Laterality Date   ? APPENDECTOMY     ? BUNIONECTOMY     ? CARPAL TUNNEL RELEASE Right 01/2017   ? CERVICAL FUSION  1976    C4-5   ? TOTAL ABDOMINAL HYSTERECTOMY W/ BILATERAL SALPINGOOPHORECTOMY        Social History     Social History   ? Marital status: Single     Spouse name: N/A   ? Number of children: 4   ? Years of education: N/A     Occupational History   ?  Retired     Social History Main Topics   ? Smoking status: Former Smoker   ? Smokeless tobacco: Never Used      Comment: Quit in 2013   ? Alcohol use No   ? Drug use: No   ? Sexual activity: Not Asked     Other Topics Concern   ? None     Social History Narrative    Lives in a senior apartment.  Has a Shipoo dog.  2 daughters who are involved in her care.      Family History   Problem Relation Age of Onset   ? Diabetes Mother    ? Heart disease Mother   "  ? Stroke Mother    ? Hypertension Mother    ? Stroke Brother    ? Stomach cancer Brother    ? Brain cancer Brother    ? Hypertension Sister    ? Hypertension Son    ? Hypertension Son    ? Hyperlipidemia Daughter       Family history is noncontributory otherwise.    Allergies: Amlodipine; Prednisone; and Sulfa (sulfonamide antibiotics)     Current medications:  Current Outpatient Prescriptions   Medication Sig   ? FLUoxetine (PROZAC) 10 MG capsule TAKE 1 CAPSULE (10 MG TOTAL) BY MOUTH DAILY.   ? lithium (LITHOBID) 300 MG CR tablet TAKE 1 TABLET BY MOUTH TWICE DAILY       Surgery specific questions:    Anesthesia/family history of complications: No  History of abnormal bleeding: No  Can patient walk a flight of stairs without stopping: Yes  Any chance the patient could be pregnant: No    Review of systems:    On ROS, the patient denies any chest pain or pressure.  No shortness of breath.   ______________________________________________________________________    Exam:    Wt Readings from Last 3 Encounters:   07/05/18 130 lb (59 kg)   09/12/17 132 lb 9.6 oz (60.1 kg)   04/11/17 130 lb (59 kg)     BP Readings from Last 3 Encounters:   07/05/18 124/80   09/12/17 110/60   04/11/17 126/64      /80  Pulse 74  Temp 97.9  F (36.6  C) (Oral)   Ht 5' 2\" (1.575 m)  Wt 130 lb (59 kg)  SpO2 96%  BMI 23.78 kg/m2     Patient is in no acute distress.  Mood good.  Insight good.  Eyes nonicteric.  Pupils equal.  Ears clear.  Nose clear.  Throat clear.  Neck is supple.  No cervical adenopathy.  No thyromegaly.  Heart is regular rate and rhythm.  Lungs clear to auscultation bilaterally.  Respiratory effort is good.  Abdomen is soft, nontender, no hepatosplenomegaly.  Extremities no edema.  The wrist is wrapped in splint.    ______________________________________________________________________    Diagnostics:    Results for orders placed or performed in visit on 07/05/18   Hemoglobin   Result Value Ref Range    Hemoglobin " 13.2 12.0 - 16.0 g/dL   Electrocardiogram Perform and Read   Result Value Ref Range    SYSTOLIC BLOOD PRESSURE  mmHg    DIASTOLIC BLOOD PRESSURE  mmHg    VENTRICULAR RATE 62 BPM    ATRIAL RATE 62 BPM    P-R INTERVAL 142 ms    QRS DURATION 78 ms    Q-T INTERVAL 402 ms    QTC CALCULATION (BEZET) 408 ms    P Axis 62 degrees    R AXIS 35 degrees    T AXIS 33 degrees    MUSE DIAGNOSIS       Normal sinus rhythm  Normal ECG  When compared with ECG of 13-DEC-2016 09:05,  No significant change was found          ______________________________________________________________________     Impression:    1. Pre-op exam    2. Right wrist fracture    3. Bipolar disorder (H)    4. HTN (hypertension)    5. Essential tremor    6. Advance directive discussed with patient    7. Full code status      ______________________________________________________________________     PHQ-2 Total Score: 0 (7/5/2018  4:00 PM)  PHQ-9 Total Score: 0 (9/12/2017  1:40 PM)    ______________________________________________________________________     Recommendations:    1. Patient is okay to proceed with surgery as planned.  2. She is at low risk for perioperative events.  3. Blood work done today.  4. EKG done and a copy was given to the patient.  5. Follow-up if issues postoperatively.       Barry Holland MD  General Internal Medicine  [facility]     Personal office fax - [phone]   Voice mail - [phone]  E-mail - [email or web address]      Return in about 1 year (around 7/5/2019), or if symptoms worsen or fail to improve.     No future appointments.     ______________________________________________________________________     Relevant ICD-10 codes and order associations:      ICD-10-CM    1. Pre-op exam Z01.818 Electrocardiogram Perform and Read   2. Right wrist fracture S62.101A Hemoglobin   3. Bipolar disorder (H) F31.9 Basic Metabolic Panel     Lithium   4. HTN (hypertension) I10 Basic Metabolic Panel     Lithium    5. Essential tremor G25.0    6. Advance directive discussed with patient Z71.89    7. Full code status Z78.9

## 2021-06-27 NOTE — PROGRESS NOTES
Progress Notes by Barry Holland MD at 7/12/2019  3:30 PM     Author: Barry Holland MD Service: -- Author Type: Physician    Filed: 7/13/2019  3:42 PM Encounter Date: 7/12/2019 Status: Signed    : Barry Holland MD (Physician)             [facility]    Comprehensive and complex medical care - Chronic disease management - Shared decision making - Care coordination - Compassionate care    Patient advocacy - Rational deprescribing - Minimally disruptive medicine - Ethical focus - Customized care    ______________________________________________________________________     Date of Service: 7/12/2019  Primary Provider: Barry Holland MD    Patient Care Team:  Barry Holland MD as PCP - General (Internal Medicine)  Shamir John MD as Physician (Orthopedic Surgery)   ______________________________________________________________________     Patient's Pharmacy:    [id number] - [place], [place] - [address] [place] [address]  Phone: [phone] Fax: [phone]     Patient's Contacts:  Name Home Phone Work Phone Mobile Phone Relationship Lgl Fabien   CHUCK CAZARES [phone]   Child    DAVEY CERVANTES [phone]   Child      Patient's Insurance:    Payor: [facility] / Plan: [facility] MEDICARE / Product Type: MEDICARE ADVANTAGE /     Subjective:     History of present illness:    Lizzy Kowalski is an 79 y.o. female here for an annual wellness visit.    Roomed by: DAVIE HAQUE    Accompanied by Daughter    Refills needed? No    Do you have any forms that need to be filled out? No       We reviewed her bipolar disorder.  She is still doing well with the fluoxetine (Prozac) and the lithium.  She occasionally forgets the evening dose of the lithium.  No side effects.    She has been noticing increased issues with shortness of breath in the last year.  Bad during the worst of the winter and now somewhat with the humidity.   Father had chronic obstructive pulmonary disease (COPD) and she wonders about this.  No chronic cough.  No chest pain or pressure.  No lower extremity edema.  She does notice some wheezing at times.  Father used an inhaler.    We reviewed her other issues noted in the assessment but not specifically addressed in the HPI above.     Review of Systems:  The 10-system review of systems and health history form for [facility] was completed by patient, reviewed by me, and pertinent problems are reviewed above.     ______________________________________________________________________     Active Problem List:  Problem List as of 7/12/2019 Reviewed: 7/12/2019 10:59 PM by Barry Holland MD       High    Former smoker - Quit in 2012    Full code status       Medium    HTN (hypertension)    Bipolar disorder (H)       Low    Essential tremor    GERD (gastroesophageal reflux disease)           Past Medical History:   Diagnosis Date   ? Bipolar disorder (H) 2/27/2017   ? Essential tremor    ? Former smoker - Quit in 2012    ? GERD (gastroesophageal reflux disease)    ? HTN (hypertension)       Past Surgical History:   Procedure Laterality Date   ? APPENDECTOMY     ? BUNIONECTOMY     ? CARPAL TUNNEL RELEASE Right 01/2017   ? CERVICAL FUSION  1976    C4-5   ? CLOSED REDUCTION AND PERCUTANEOUS PINNING DISTAL RADIUS FRACTURE Right 2018   ? TOTAL ABDOMINAL HYSTERECTOMY W/ BILATERAL SALPINGOOPHORECTOMY        Family History   Problem Relation Age of Onset   ? Diabetes Mother    ? Heart disease Mother    ? Stroke Mother    ? Hypertension Mother    ? COPD Father    ? Stroke Brother    ? Stomach cancer Brother    ? Brain cancer Brother    ? Stroke Brother    ? Hyperlipidemia Son    ? Hypertension Son    ? Hyperlipidemia Daughter    ? Hypertension Sister    ? Other Sister         Oral Cancer      Family history is otherwise noncontributory.    Social History     Occupational History     Employer: RETIRED   Tobacco Use   ? Smoking status:  "Former Smoker   ? Smokeless tobacco: Never Used   ? Tobacco comment: Quit in 2013   Substance and Sexual Activity   ? Alcohol use: No     Alcohol/week: 3.5 oz     Types: 7 Standard drinks or equivalent per week   ? Drug use: No   ? Sexual activity: Not on file      Social History     Social History Narrative    Lives in a senior apartment.  Has a Shipoo dog.  2 daughters who are involved in her care.      Current Outpatient Medications   Medication Sig   ? FLUoxetine (PROZAC) 10 MG capsule TAKE 1 CAPSULE (10 MG TOTAL) BY MOUTH DAILY.   ? lithium (LITHOBID) 300 MG CR tablet TAKE 1 TABLET BY MOUTH TWICE A DAY      Allergies: Amlodipine; Prednisone; and Sulfa (sulfonamide antibiotics)     Immunization History   Administered Date(s) Administered   ? Td,adult,historic,unspecified 1940      Objective:     Vital Signs:   Visit Vitals  /80   Pulse 72   Ht 5' 1.5\" (1.562 m)   Wt 132 lb (59.9 kg)   SpO2 97%   BMI 24.54 kg/m       Wt Readings from Last 3 Encounters:   07/12/19 132 lb (59.9 kg)   07/05/18 130 lb (59 kg)   09/12/17 132 lb 9.6 oz (60.1 kg)     BP Readings from Last 3 Encounters:   07/12/19 132/80   07/05/18 124/80   09/12/17 110/60     Vision Screening:  No exam data present     PHYSICAL EXAM  Patient declines an undressed exam.   The patient is comfortable, no acute distress.  Mood good.  Insight is good.  No skin lesions or nodules of concern.  Ears clear.  Eyes are nonicteric.  Pupils equal and reactive.  Throat is clear.  Neck is supple without mass, no thyromegaly. No cervical or epitrochlear adenopathy.  Heart regular rate and rhythm.  Lungs clear to auscultation bilaterally.  Respiratory effort good.  Abdomen soft and nontender.  No hepatosplenomegaly.  Extremities show no edema.      Assessment Results 7/12/2019   Activities of Daily Living No help needed   Instrumental Activities of Daily Living No help needed   Mini Cog Total Score 4   Some recent data might be hidden     A Mini-Cog score of " 0-2 suggests the possibility of dementia, score of 3-5 suggests no dementia    Diagnostics:     Recent Results (from the past 240 hour(s))   Lithium   Result Value Ref Range    Lithium 0.4 (L) 0.6 - 1.2 mmol/L   Basic Metabolic Panel   Result Value Ref Range    Sodium 141 136 - 145 mmol/L    Potassium 4.1 3.5 - 5.0 mmol/L    Chloride 109 (H) 98 - 107 mmol/L    CO2 22 22 - 31 mmol/L    Anion Gap, Calculation 10 5 - 18 mmol/L    Glucose 73 70 - 125 mg/dL    Calcium 9.6 8.5 - 10.5 mg/dL    BUN 17 8 - 28 mg/dL    Creatinine 0.84 0.60 - 1.10 mg/dL    GFR MDRD Af Amer >60 >60 mL/min/1.73m2    GFR MDRD Non Af Amer >60 >60 mL/min/1.73m2   HM1 (CBC with Diff)   Result Value Ref Range    WBC 8.7 4.0 - 11.0 thou/uL    RBC 4.60 3.80 - 5.40 mill/uL    Hemoglobin 13.7 12.0 - 16.0 g/dL    Hematocrit 40.4 35.0 - 47.0 %    MCV 88 80 - 100 fL    MCH 29.8 27.0 - 34.0 pg    MCHC 33.9 32.0 - 36.0 g/dL    RDW 12.1 11.0 - 14.5 %    Platelets 233 140 - 440 thou/uL    MPV 8.2 7.0 - 10.0 fL    Neutrophils % 65 50 - 70 %    Lymphocytes % 26 20 - 40 %    Monocytes % 7 2 - 10 %    Eosinophils % 2 0 - 6 %    Basophils % 1 0 - 2 %    Neutrophils Absolute 5.7 2.0 - 7.7 thou/uL    Lymphocytes Absolute 2.3 0.8 - 4.4 thou/uL    Monocytes Absolute 0.6 0.0 - 0.9 thou/uL    Eosinophils Absolute 0.1 0.0 - 0.4 thou/uL    Basophils Absolute 0.0 0.0 - 0.2 thou/uL        ______________________________________________________________________     PHQ-2 Total Score: 0 (7/12/2019  3:00 PM)    No data recorded  ______________________________________________________________________     BMI Readings from Last 1 Encounters:   07/12/19 24.54 kg/m      ______________________________________________________________________    Assessment:     1. Medicare annual wellness visit, subsequent    2. SOB (shortness of breath)    3. Bipolar disorder, in full remission, most recent episode manic (H)    4. Wheezing    5. Essential hypertension    6. Essential tremor    7. Former  smoker - Quit in 2012         Plan:     1. Check blood work today.  See relevant orders and diagnosis associations at the bottom of this note.   2. Chest x-ray (CXR) done today.  3. Consider pulmonary function tests (PFTs) to assess lung situation more.  4. Continue lithium and fluoxetine (Prozac).  5. No other changes.         Barry Holland MD  General Internal Medicine  [facility]     Personal office fax - [phone]   Voice mail - [phone]  E-mail - [email or web address]     Return in about 1 year (around 7/12/2020), or if symptoms worsen or fail to improve, for annual wellness visit.     No future appointments.      ______________________________________________________________________     Relevant ICD-10 codes and order associations:      ICD-10-CM    1. Medicare annual wellness visit, subsequent Z00.00    2. SOB (shortness of breath) R06.02 XR Chest 2 Views     BNP(B-type Natriuretic Peptide)     HM1(CBC and Differential)     HM1 (CBC with Diff)   3. Bipolar disorder, in full remission, most recent episode manic (H) F31.74 Lithium     Basic Metabolic Panel   4. Wheezing R06.2    5. Essential hypertension I10    6. Essential tremor G25.0    7. Former smoker - Quit in 2012 Z87.891         Identified Health Risks:     A personalized health plan based on the identified health risks was provided to the patient on the AVS.    ______________________________________________________________________     The following health maintenance schedule was reviewed with the patient and provided in printed form in the after visit summary:     Health Maintenance   Topic Date Due   ? ZOSTER VACCINES (2 of 2) 06/06/2017   ? FALL RISK ASSESSMENT  07/12/2020   ? ADVANCE DIRECTIVES DISCUSSED WITH PATIENT  07/05/2023   ? TD 18+ HE  04/11/2027   ? DXA SCAN  Addressed   ? PNEUMOCOCCAL POLYSACCHARIDE VACCINE AGE 65 AND OVER  Addressed   ? PNEUMOCOCCAL CONJUGATE VACCINE FOR ADULTS (PCV13 OR PREVNAR)  Addressed    ? INFLUENZA VACCINE RULE BASED  Discontinued        ______________________________________________________________________

## 2021-06-29 NOTE — PROGRESS NOTES
Progress Notes by Barry Holland MD at 2020 10:05 AM     Author: Barry Holland MD Service: -- Author Type: Physician    Filed: 2020 11:23 PM Encounter Date: 2020 Status: Signed    : Barry Holland MD (Physician)              [facility]    Comprehensive and complex medical care - Chronic disease management - Shared decision making - Care coordination - Compassionate care    Patient advocacy - Rational deprescribing - Minimally disruptive medicine - Ethical focus - Customized care          Date of Service: 2020  Primary Provider: Barry Holland MD    Patient Care Team:  Barry Holland MD as PCP - General (Internal Medicine)  Shamir John MD as Physician (Orthopedic Surgery)  Barry Holland MD as Assigned PCP     ______________________________________________________________________     Patient's Pharmacy:      [id number] [facility] - [place], [place] - [address] [place] [address]  Phone: [phone] Fax: [phone]     Patient's Contacts:  Name Home Phone Work Phone Mobile Phone Relationship Lgl Gr   CHUCK CAZARES [phone]   Child    DAVEY CERVANTES [phone]   Child        Patient's Insurance:    Payor/Plan Subscr  Sex Relation Sub. Ins. ID Effective Group Num   1. UCARE - UCARE* LIZZY KOWALSKI 1940 Female  [id number] Not Eff [id number]                                   [address]   2. [place]ARE - [place]* LIZZY KOWALSKI 1940 Female Self [id number]                                    [place], [address]           Lizzy Kowalski is a 80 y.o. female who comes in today for:    Chief Complaint   Patient presents with   ? Medication Management     went back to 1 lithium a day       Active Problem List:  Problem List as of 2020 Reviewed: 2019 10:59 PM by Barry Holland MD       High    Former smoker - Quit in     Full code status       Medium    HTN  (hypertension)    Bipolar disorder (H)       Low    Essential tremor    GERD (gastroesophageal reflux disease)           Current Outpatient Medications   Medication Sig   ? FLUoxetine (PROZAC) 10 MG capsule Take 1 capsule (10 mg total) by mouth daily.   ? lithium (LITHOBID) 300 MG CR tablet Take 1 tablet (300 mg total) by mouth daily.       Social History     Social History Narrative    Lives in a senior apartment.  Has a Shipoo dog.  2 daughters who are involved in her care.       Subjective:     Patient comes in today for follow-up with a number of issues.    We first reviewed her bipolar disorder.  This has been controlled on a combination of fluoxetine 10 mg a day along with lithium 300 mg a day.  She did take it twice daily at one point but she did not like the side effects with it.  She feels like her mood is under decent control with this.  Her kids usually continue to monitor her and let her know if there are any problems.    We reviewed her high blood pressure.  She has a significant whitecoat component to this.  Her blood pressure as an outpatient have been good.  She continues to monitor this.  She does not want any other testing or therapy at this time.  She has no signs of LVH on her EKG in the past.  Is been no signs of endorgan damage.    We reviewed her COPD diagnosis from last year.  She still has shortness of breath with activity and this is particularly bad in the winter.  We reviewed the pulmonary function test.  We talked about trials of inhalers but she is not interested in this at this time.    Her tremor has not worsened.    We reviewed her other issues noted in the assessment but not specifically addressed in the HPI above.     On review of systems, the patient denies any chest pain or shortness of breath.    Objective:     Wt Readings from Last 3 Encounters:   07/06/20 132 lb (59.9 kg)   07/12/19 132 lb (59.9 kg)   07/05/18 130 lb (59 kg)       BP Readings from Last 3 Encounters:   07/06/20  "136/84   07/12/19 132/80   07/05/18 124/80       /84   Pulse 89   Ht 5' 2.25\" (1.581 m)   Wt 132 lb (59.9 kg)   SpO2 96%   BMI 23.95 kg/m     The patient is comfortable, no acute distress.  Mood good.  Insight good.  Eyes are nonicteric.  Neck is supple without mass.  No cervical adenopathy.  No thyromegaly. Heart regular rate and rhythm.  Lungs clear to auscultation bilaterally.  Respiratory effort is good.  Abdomen soft and nontender.  No hepatosplenomegaly.  Extremities no edema.      Diagnostics:     Results for orders placed or performed in visit on 07/06/20   Lithium   Result Value Ref Range    Lithium <0.2 (L) 0.6 - 1.2 mmol/L   Basic Metabolic Panel   Result Value Ref Range    Sodium 139 136 - 145 mmol/L    Potassium 4.1 3.5 - 5.0 mmol/L    Chloride 104 98 - 107 mmol/L    CO2 24 22 - 31 mmol/L    Anion Gap, Calculation 11 5 - 18 mmol/L    Glucose 88 70 - 125 mg/dL    Calcium 9.2 8.5 - 10.5 mg/dL    BUN 12 8 - 28 mg/dL    Creatinine 0.83 0.60 - 1.10 mg/dL    GFR MDRD Af Amer >60 >60 mL/min/1.73m2    GFR MDRD Non Af Amer >60 >60 mL/min/1.73m2       Quality review:     PHQ-2 Total Score: 1 (7/6/2020 10:00 AM)      No data recorded  ______________________________________________________________________     BMI Readings from Last 1 Encounters:   07/06/20 23.95 kg/m        Assessment and Plan:     1. Essential hypertension    - Basic Metabolic Panel    2. Bipolar affective disorder, remission status unspecified (H)  Stable at this time and continue to monitor at least yearly.  Could see psychiatry in the future if needed.    - Lithium  - Basic Metabolic Panel  - lithium (LITHOBID) 300 MG CR tablet; Take 1 tablet (300 mg total) by mouth daily.  - FLUoxetine (PROZAC) 10 MG capsule; Take 1 capsule (10 mg total) by mouth daily.  Dispense: 90 capsule; Refill: 3    3. Chronic obstructive pulmonary disease, unspecified COPD type (H)  Patient does not desire additional treatment at this time.  Reviewed " pneumococcal vaccine again in the future, but patient has declined.    4. Essential tremor  Consider beta-blocker therapy in the future if needed.          Barry Holland MD  General Internal Medicine  [facility]     Personal office fax - [phone]   Voice mail - [phone]  E-mail - [email or web address]     No follow-ups on file.     No future appointments.      HCC issues resolved at this visit.

## 2021-07-25 ENCOUNTER — HEALTH MAINTENANCE LETTER (OUTPATIENT)
Age: 81
End: 2021-07-25

## 2022-01-05 ENCOUNTER — LAB REQUISITION (OUTPATIENT)
Dept: LAB | Facility: CLINIC | Age: 82
End: 2022-01-05
Payer: COMMERCIAL

## 2022-01-05 DIAGNOSIS — F03.90 UNSPECIFIED DEMENTIA WITHOUT BEHAVIORAL DISTURBANCE: ICD-10-CM

## 2022-01-07 LAB
ALBUMIN SERPL-MCNC: 3.7 G/DL (ref 3.5–5)
ALP SERPL-CCNC: 99 U/L (ref 45–120)
ALT SERPL W P-5'-P-CCNC: 20 U/L (ref 0–45)
ANION GAP SERPL CALCULATED.3IONS-SCNC: 7 MMOL/L (ref 5–18)
AST SERPL W P-5'-P-CCNC: 16 U/L (ref 0–40)
BILIRUB SERPL-MCNC: 0.7 MG/DL (ref 0–1)
BUN SERPL-MCNC: 13 MG/DL (ref 8–28)
CALCIUM SERPL-MCNC: 9.8 MG/DL (ref 8.5–10.5)
CHLORIDE BLD-SCNC: 107 MMOL/L (ref 98–107)
CO2 SERPL-SCNC: 25 MMOL/L (ref 22–31)
CREAT SERPL-MCNC: 0.86 MG/DL (ref 0.6–1.1)
ERYTHROCYTE [DISTWIDTH] IN BLOOD BY AUTOMATED COUNT: 13.2 % (ref 10–15)
GFR SERPL CREATININE-BSD FRML MDRD: 67 ML/MIN/1.73M2
GLUCOSE BLD-MCNC: 81 MG/DL (ref 70–125)
HCT VFR BLD AUTO: 41.8 % (ref 35–47)
HGB BLD-MCNC: 13.3 G/DL (ref 11.7–15.7)
MCH RBC QN AUTO: 29.2 PG (ref 26.5–33)
MCHC RBC AUTO-ENTMCNC: 31.8 G/DL (ref 31.5–36.5)
MCV RBC AUTO: 92 FL (ref 78–100)
PLATELET # BLD AUTO: 220 10E3/UL (ref 150–450)
POTASSIUM BLD-SCNC: 4.7 MMOL/L (ref 3.5–5)
PROT SERPL-MCNC: 6.9 G/DL (ref 6–8)
RBC # BLD AUTO: 4.56 10E6/UL (ref 3.8–5.2)
SODIUM SERPL-SCNC: 139 MMOL/L (ref 136–145)
TSH SERPL DL<=0.005 MIU/L-ACNC: 2.75 UIU/ML (ref 0.3–5)
VIT B12 SERPL-MCNC: 540 PG/ML (ref 213–816)
WBC # BLD AUTO: 7.6 10E3/UL (ref 4–11)

## 2022-01-07 PROCEDURE — 82607 VITAMIN B-12: CPT | Mod: ORL | Performed by: INTERNAL MEDICINE

## 2022-01-07 PROCEDURE — 85027 COMPLETE CBC AUTOMATED: CPT | Mod: ORL | Performed by: INTERNAL MEDICINE

## 2022-01-07 PROCEDURE — 36415 COLL VENOUS BLD VENIPUNCTURE: CPT | Mod: ORL | Performed by: INTERNAL MEDICINE

## 2022-01-07 PROCEDURE — 84443 ASSAY THYROID STIM HORMONE: CPT | Mod: ORL | Performed by: INTERNAL MEDICINE

## 2022-01-07 PROCEDURE — 80053 COMPREHEN METABOLIC PANEL: CPT | Mod: ORL | Performed by: INTERNAL MEDICINE

## 2022-05-10 ENCOUNTER — LAB REQUISITION (OUTPATIENT)
Dept: LAB | Facility: CLINIC | Age: 82
End: 2022-05-10
Payer: COMMERCIAL

## 2022-05-10 DIAGNOSIS — Z51.81 ENCOUNTER FOR THERAPEUTIC DRUG LEVEL MONITORING: ICD-10-CM

## 2022-05-20 LAB
ALBUMIN SERPL-MCNC: 3.6 G/DL (ref 3.5–5)
ALP SERPL-CCNC: 91 U/L (ref 45–120)
ALT SERPL W P-5'-P-CCNC: 15 U/L (ref 0–45)
ANION GAP SERPL CALCULATED.3IONS-SCNC: 5 MMOL/L (ref 5–18)
AST SERPL W P-5'-P-CCNC: 14 U/L (ref 0–40)
BILIRUB SERPL-MCNC: 0.7 MG/DL (ref 0–1)
BUN SERPL-MCNC: 12 MG/DL (ref 8–28)
CALCIUM SERPL-MCNC: 9.2 MG/DL (ref 8.5–10.5)
CHLORIDE BLD-SCNC: 106 MMOL/L (ref 98–107)
CO2 SERPL-SCNC: 28 MMOL/L (ref 22–31)
CREAT SERPL-MCNC: 0.83 MG/DL (ref 0.6–1.1)
GFR SERPL CREATININE-BSD FRML MDRD: 70 ML/MIN/1.73M2
GLUCOSE BLD-MCNC: 155 MG/DL (ref 70–125)
POTASSIUM BLD-SCNC: 3.9 MMOL/L (ref 3.5–5)
PROT SERPL-MCNC: 6.9 G/DL (ref 6–8)
SODIUM SERPL-SCNC: 139 MMOL/L (ref 136–145)

## 2022-05-20 PROCEDURE — 80053 COMPREHEN METABOLIC PANEL: CPT | Mod: ORL | Performed by: INTERNAL MEDICINE

## 2022-05-20 PROCEDURE — P9604 ONE-WAY ALLOW PRORATED TRIP: HCPCS | Mod: ORL | Performed by: INTERNAL MEDICINE

## 2022-05-20 PROCEDURE — 36415 COLL VENOUS BLD VENIPUNCTURE: CPT | Mod: ORL | Performed by: INTERNAL MEDICINE

## 2022-08-21 ENCOUNTER — HEALTH MAINTENANCE LETTER (OUTPATIENT)
Age: 82
End: 2022-08-21

## 2022-10-27 ENCOUNTER — LAB REQUISITION (OUTPATIENT)
Dept: LAB | Facility: CLINIC | Age: 82
End: 2022-10-27
Payer: COMMERCIAL

## 2022-10-27 DIAGNOSIS — R30.0 DYSURIA: ICD-10-CM

## 2022-10-27 LAB
ALBUMIN UR-MCNC: NEGATIVE MG/DL
APPEARANCE UR: CLEAR
BILIRUB UR QL STRIP: NEGATIVE
COLOR UR AUTO: ABNORMAL
GLUCOSE UR STRIP-MCNC: NEGATIVE MG/DL
HGB UR QL STRIP: NEGATIVE
KETONES UR STRIP-MCNC: NEGATIVE MG/DL
LEUKOCYTE ESTERASE UR QL STRIP: ABNORMAL
MUCOUS THREADS #/AREA URNS LPF: PRESENT /LPF
NITRATE UR QL: NEGATIVE
PH UR STRIP: 6 [PH] (ref 5–7)
RBC URINE: 1 /HPF
SP GR UR STRIP: 1.01 (ref 1–1.03)
SQUAMOUS EPITHELIAL: <1 /HPF
TRANSITIONAL EPI: <1 /HPF
UROBILINOGEN UR STRIP-MCNC: NORMAL MG/DL
WBC URINE: 3 /HPF

## 2022-10-27 PROCEDURE — 81001 URINALYSIS AUTO W/SCOPE: CPT | Mod: ORL | Performed by: INTERNAL MEDICINE

## 2022-10-31 ENCOUNTER — LAB REQUISITION (OUTPATIENT)
Dept: LAB | Facility: CLINIC | Age: 82
End: 2022-10-31
Payer: COMMERCIAL

## 2022-10-31 DIAGNOSIS — F03.90 UNSPECIFIED DEMENTIA, UNSPECIFIED SEVERITY, WITHOUT BEHAVIORAL DISTURBANCE, PSYCHOTIC DISTURBANCE, MOOD DISTURBANCE, AND ANXIETY (H): ICD-10-CM

## 2022-11-03 LAB
ALBUMIN SERPL BCG-MCNC: 4.2 G/DL (ref 3.5–5.2)
ALP SERPL-CCNC: 97 U/L (ref 35–104)
ALT SERPL W P-5'-P-CCNC: 21 U/L (ref 10–35)
ANION GAP SERPL CALCULATED.3IONS-SCNC: 12 MMOL/L (ref 7–15)
AST SERPL W P-5'-P-CCNC: 23 U/L (ref 10–35)
BILIRUB SERPL-MCNC: 0.5 MG/DL
BUN SERPL-MCNC: 13.4 MG/DL (ref 8–23)
CALCIUM SERPL-MCNC: 9.6 MG/DL (ref 8.8–10.2)
CHLORIDE SERPL-SCNC: 107 MMOL/L (ref 98–107)
CREAT SERPL-MCNC: 0.83 MG/DL (ref 0.51–0.95)
DEPRECATED HCO3 PLAS-SCNC: 22 MMOL/L (ref 22–29)
ERYTHROCYTE [DISTWIDTH] IN BLOOD BY AUTOMATED COUNT: 13.2 % (ref 10–15)
GFR SERPL CREATININE-BSD FRML MDRD: 70 ML/MIN/1.73M2
GLUCOSE SERPL-MCNC: 54 MG/DL (ref 70–99)
HCT VFR BLD AUTO: 42.1 % (ref 35–47)
HGB BLD-MCNC: 13.4 G/DL (ref 11.7–15.7)
MCH RBC QN AUTO: 29.6 PG (ref 26.5–33)
MCHC RBC AUTO-ENTMCNC: 31.8 G/DL (ref 31.5–36.5)
MCV RBC AUTO: 93 FL (ref 78–100)
PLATELET # BLD AUTO: 208 10E3/UL (ref 150–450)
POTASSIUM SERPL-SCNC: 4.5 MMOL/L (ref 3.4–5.3)
PROT SERPL-MCNC: 6.9 G/DL (ref 6.4–8.3)
RBC # BLD AUTO: 4.53 10E6/UL (ref 3.8–5.2)
SODIUM SERPL-SCNC: 141 MMOL/L (ref 136–145)
TSH SERPL DL<=0.005 MIU/L-ACNC: 2.42 UIU/ML (ref 0.3–4.2)
VIT B12 SERPL-MCNC: 497 PG/ML (ref 232–1245)
WBC # BLD AUTO: 7.9 10E3/UL (ref 4–11)

## 2022-11-03 PROCEDURE — 80053 COMPREHEN METABOLIC PANEL: CPT | Mod: ORL | Performed by: INTERNAL MEDICINE

## 2022-11-03 PROCEDURE — P9604 ONE-WAY ALLOW PRORATED TRIP: HCPCS | Mod: ORL | Performed by: INTERNAL MEDICINE

## 2022-11-03 PROCEDURE — 36415 COLL VENOUS BLD VENIPUNCTURE: CPT | Mod: ORL | Performed by: INTERNAL MEDICINE

## 2022-11-03 PROCEDURE — 85027 COMPLETE CBC AUTOMATED: CPT | Mod: ORL | Performed by: INTERNAL MEDICINE

## 2022-11-03 PROCEDURE — 82607 VITAMIN B-12: CPT | Mod: ORL | Performed by: INTERNAL MEDICINE

## 2022-11-03 PROCEDURE — 84443 ASSAY THYROID STIM HORMONE: CPT | Mod: ORL | Performed by: INTERNAL MEDICINE

## 2023-05-17 ENCOUNTER — LAB REQUISITION (OUTPATIENT)
Dept: LAB | Facility: CLINIC | Age: 83
End: 2023-05-17
Payer: COMMERCIAL

## 2023-05-17 DIAGNOSIS — F01.50 VASCULAR DEMENTIA, UNSPECIFIED SEVERITY, WITHOUT BEHAVIORAL DISTURBANCE, PSYCHOTIC DISTURBANCE, MOOD DISTURBANCE, AND ANXIETY (H): ICD-10-CM

## 2023-05-17 DIAGNOSIS — I63.9 CEREBRAL INFARCTION, UNSPECIFIED (H): ICD-10-CM

## 2023-05-18 LAB
ALBUMIN SERPL BCG-MCNC: 4.2 G/DL (ref 3.5–5.2)
ALP SERPL-CCNC: 92 U/L (ref 35–104)
ALT SERPL W P-5'-P-CCNC: 19 U/L (ref 10–35)
ANION GAP SERPL CALCULATED.3IONS-SCNC: 13 MMOL/L (ref 7–15)
AST SERPL W P-5'-P-CCNC: 22 U/L (ref 10–35)
BILIRUB SERPL-MCNC: 0.5 MG/DL
BUN SERPL-MCNC: 10.7 MG/DL (ref 8–23)
CALCIUM SERPL-MCNC: 9.5 MG/DL (ref 8.8–10.2)
CHLORIDE SERPL-SCNC: 108 MMOL/L (ref 98–107)
CREAT SERPL-MCNC: 0.85 MG/DL (ref 0.51–0.95)
DEPRECATED HCO3 PLAS-SCNC: 23 MMOL/L (ref 22–29)
ERYTHROCYTE [DISTWIDTH] IN BLOOD BY AUTOMATED COUNT: 13.7 % (ref 10–15)
GFR SERPL CREATININE-BSD FRML MDRD: 68 ML/MIN/1.73M2
GLUCOSE SERPL-MCNC: 70 MG/DL (ref 70–99)
HCT VFR BLD AUTO: 41.8 % (ref 35–47)
HGB BLD-MCNC: 12.9 G/DL (ref 11.7–15.7)
MCH RBC QN AUTO: 29.1 PG (ref 26.5–33)
MCHC RBC AUTO-ENTMCNC: 30.9 G/DL (ref 31.5–36.5)
MCV RBC AUTO: 94 FL (ref 78–100)
PLATELET # BLD AUTO: 217 10E3/UL (ref 150–450)
POTASSIUM SERPL-SCNC: 4.1 MMOL/L (ref 3.4–5.3)
PROT SERPL-MCNC: 6.5 G/DL (ref 6.4–8.3)
RBC # BLD AUTO: 4.44 10E6/UL (ref 3.8–5.2)
SODIUM SERPL-SCNC: 144 MMOL/L (ref 136–145)
TSH SERPL DL<=0.005 MIU/L-ACNC: 2.01 UIU/ML (ref 0.3–4.2)
VIT B12 SERPL-MCNC: 444 PG/ML (ref 232–1245)
WBC # BLD AUTO: 8.3 10E3/UL (ref 4–11)

## 2023-05-18 PROCEDURE — 82607 VITAMIN B-12: CPT | Mod: ORL | Performed by: PHYSICIAN ASSISTANT

## 2023-05-18 PROCEDURE — 80053 COMPREHEN METABOLIC PANEL: CPT | Mod: ORL | Performed by: PHYSICIAN ASSISTANT

## 2023-05-18 PROCEDURE — 85027 COMPLETE CBC AUTOMATED: CPT | Mod: ORL | Performed by: PHYSICIAN ASSISTANT

## 2023-05-18 PROCEDURE — 84443 ASSAY THYROID STIM HORMONE: CPT | Mod: ORL | Performed by: PHYSICIAN ASSISTANT

## 2023-05-18 PROCEDURE — 82306 VITAMIN D 25 HYDROXY: CPT | Mod: ORL | Performed by: PHYSICIAN ASSISTANT

## 2023-05-18 PROCEDURE — 36415 COLL VENOUS BLD VENIPUNCTURE: CPT | Mod: ORL | Performed by: PHYSICIAN ASSISTANT

## 2023-05-18 PROCEDURE — P9604 ONE-WAY ALLOW PRORATED TRIP: HCPCS | Mod: ORL | Performed by: PHYSICIAN ASSISTANT

## 2023-05-19 LAB — DEPRECATED CALCIDIOL+CALCIFEROL SERPL-MC: 12 UG/L (ref 20–75)

## 2023-06-15 PROCEDURE — 81001 URINALYSIS AUTO W/SCOPE: CPT | Mod: ORL | Performed by: PHYSICIAN ASSISTANT

## 2023-08-10 ENCOUNTER — LAB REQUISITION (OUTPATIENT)
Dept: LAB | Facility: CLINIC | Age: 83
End: 2023-08-10
Payer: COMMERCIAL

## 2023-08-10 DIAGNOSIS — R30.9 PAINFUL MICTURITION, UNSPECIFIED: ICD-10-CM

## 2023-08-10 LAB
ALBUMIN UR-MCNC: NEGATIVE MG/DL
APPEARANCE UR: CLEAR
BILIRUB UR QL STRIP: NEGATIVE
COLOR UR AUTO: NORMAL
GLUCOSE UR STRIP-MCNC: NEGATIVE MG/DL
HGB UR QL STRIP: NEGATIVE
KETONES UR STRIP-MCNC: NEGATIVE MG/DL
LEUKOCYTE ESTERASE UR QL STRIP: NEGATIVE
NITRATE UR QL: NEGATIVE
PH UR STRIP: 6 [PH] (ref 5–7)
RBC URINE: <1 /HPF
SP GR UR STRIP: 1.01 (ref 1–1.03)
SQUAMOUS EPITHELIAL: <1 /HPF
UROBILINOGEN UR STRIP-MCNC: NORMAL MG/DL
WBC URINE: 1 /HPF

## 2023-08-10 PROCEDURE — 81001 URINALYSIS AUTO W/SCOPE: CPT | Mod: ORL | Performed by: PHYSICIAN ASSISTANT

## 2023-08-10 PROCEDURE — 87086 URINE CULTURE/COLONY COUNT: CPT | Mod: ORL | Performed by: PHYSICIAN ASSISTANT

## 2023-08-12 LAB — BACTERIA UR CULT: NORMAL

## 2023-09-16 ENCOUNTER — HEALTH MAINTENANCE LETTER (OUTPATIENT)
Age: 83
End: 2023-09-16

## 2024-05-08 ENCOUNTER — LAB REQUISITION (OUTPATIENT)
Dept: LAB | Facility: CLINIC | Age: 84
End: 2024-05-08
Payer: COMMERCIAL

## 2024-05-08 DIAGNOSIS — R41.82 ALTERED MENTAL STATUS, UNSPECIFIED: ICD-10-CM

## 2024-05-08 PROCEDURE — 87086 URINE CULTURE/COLONY COUNT: CPT | Mod: ORL | Performed by: PHYSICIAN ASSISTANT

## 2024-05-08 PROCEDURE — 87186 SC STD MICRODIL/AGAR DIL: CPT | Mod: ORL | Performed by: PHYSICIAN ASSISTANT

## 2024-05-10 LAB — BACTERIA UR CULT: ABNORMAL

## 2024-07-22 ENCOUNTER — LAB REQUISITION (OUTPATIENT)
Dept: LAB | Facility: CLINIC | Age: 84
End: 2024-07-22
Payer: COMMERCIAL

## 2024-07-22 DIAGNOSIS — R41.82 ALTERED MENTAL STATUS, UNSPECIFIED: ICD-10-CM

## 2024-07-22 LAB
ALBUMIN UR-MCNC: NEGATIVE MG/DL
APPEARANCE UR: CLEAR
BILIRUB UR QL STRIP: NEGATIVE
COLOR UR AUTO: ABNORMAL
GLUCOSE UR STRIP-MCNC: NEGATIVE MG/DL
HGB UR QL STRIP: NEGATIVE
KETONES UR STRIP-MCNC: NEGATIVE MG/DL
LEUKOCYTE ESTERASE UR QL STRIP: ABNORMAL
MUCOUS THREADS #/AREA URNS LPF: PRESENT /LPF
NITRATE UR QL: NEGATIVE
PH UR STRIP: 6 [PH] (ref 5–7)
RBC URINE: <1 /HPF
SP GR UR STRIP: 1.01 (ref 1–1.03)
SQUAMOUS EPITHELIAL: 2 /HPF
UROBILINOGEN UR STRIP-MCNC: NORMAL MG/DL
WBC URINE: 4 /HPF

## 2024-07-22 PROCEDURE — 87086 URINE CULTURE/COLONY COUNT: CPT | Mod: ORL | Performed by: PHYSICIAN ASSISTANT

## 2024-07-22 PROCEDURE — 87186 SC STD MICRODIL/AGAR DIL: CPT | Mod: ORL | Performed by: PHYSICIAN ASSISTANT

## 2024-07-22 PROCEDURE — 81001 URINALYSIS AUTO W/SCOPE: CPT | Mod: ORL | Performed by: PHYSICIAN ASSISTANT

## 2024-07-24 LAB — BACTERIA UR CULT: ABNORMAL

## 2024-10-29 ENCOUNTER — LAB REQUISITION (OUTPATIENT)
Dept: LAB | Facility: CLINIC | Age: 84
End: 2024-10-29
Payer: COMMERCIAL

## 2024-10-29 DIAGNOSIS — F03.90 UNSPECIFIED DEMENTIA, UNSPECIFIED SEVERITY, WITHOUT BEHAVIORAL DISTURBANCE, PSYCHOTIC DISTURBANCE, MOOD DISTURBANCE, AND ANXIETY (H): ICD-10-CM

## 2024-10-29 DIAGNOSIS — E55.9 VITAMIN D DEFICIENCY, UNSPECIFIED: ICD-10-CM

## 2024-10-29 DIAGNOSIS — E78.5 HYPERLIPIDEMIA, UNSPECIFIED: ICD-10-CM

## 2024-10-29 DIAGNOSIS — K21.9 GASTRO-ESOPHAGEAL REFLUX DISEASE WITHOUT ESOPHAGITIS: ICD-10-CM

## 2024-10-31 LAB
ERYTHROCYTE [DISTWIDTH] IN BLOOD BY AUTOMATED COUNT: 13.9 % (ref 10–15)
HCT VFR BLD AUTO: 37.5 % (ref 35–47)
HGB BLD-MCNC: 11.8 G/DL (ref 11.7–15.7)
MCH RBC QN AUTO: 29 PG (ref 26.5–33)
MCHC RBC AUTO-ENTMCNC: 31.5 G/DL (ref 31.5–36.5)
MCV RBC AUTO: 92 FL (ref 78–100)
PLATELET # BLD AUTO: 225 10E3/UL (ref 150–450)
RBC # BLD AUTO: 4.07 10E6/UL (ref 3.8–5.2)
WBC # BLD AUTO: 6.5 10E3/UL (ref 4–11)

## 2024-10-31 PROCEDURE — 83735 ASSAY OF MAGNESIUM: CPT | Mod: ORL | Performed by: INTERNAL MEDICINE

## 2024-10-31 PROCEDURE — 82607 VITAMIN B-12: CPT | Mod: ORL | Performed by: INTERNAL MEDICINE

## 2024-10-31 PROCEDURE — 85027 COMPLETE CBC AUTOMATED: CPT | Mod: ORL | Performed by: INTERNAL MEDICINE

## 2024-10-31 PROCEDURE — 36415 COLL VENOUS BLD VENIPUNCTURE: CPT | Mod: ORL | Performed by: INTERNAL MEDICINE

## 2024-10-31 PROCEDURE — 80053 COMPREHEN METABOLIC PANEL: CPT | Mod: ORL | Performed by: INTERNAL MEDICINE

## 2024-10-31 PROCEDURE — 82306 VITAMIN D 25 HYDROXY: CPT | Mod: ORL | Performed by: INTERNAL MEDICINE

## 2024-10-31 PROCEDURE — 80061 LIPID PANEL: CPT | Mod: ORL | Performed by: INTERNAL MEDICINE

## 2024-10-31 PROCEDURE — 84443 ASSAY THYROID STIM HORMONE: CPT | Mod: ORL | Performed by: INTERNAL MEDICINE

## 2024-11-01 LAB
ALBUMIN SERPL BCG-MCNC: 3.6 G/DL (ref 3.5–5.2)
ALP SERPL-CCNC: 107 U/L (ref 40–150)
ALT SERPL W P-5'-P-CCNC: 57 U/L (ref 0–50)
ANION GAP SERPL CALCULATED.3IONS-SCNC: 11 MMOL/L (ref 7–15)
AST SERPL W P-5'-P-CCNC: 27 U/L (ref 0–45)
BILIRUB SERPL-MCNC: 0.5 MG/DL
BUN SERPL-MCNC: 18.5 MG/DL (ref 8–23)
CALCIUM SERPL-MCNC: 9.6 MG/DL (ref 8.8–10.4)
CHLORIDE SERPL-SCNC: 105 MMOL/L (ref 98–107)
CHOLEST SERPL-MCNC: 110 MG/DL
CREAT SERPL-MCNC: 0.87 MG/DL (ref 0.51–0.95)
EGFRCR SERPLBLD CKD-EPI 2021: 65 ML/MIN/1.73M2
FASTING STATUS PATIENT QL REPORTED: ABNORMAL
GLUCOSE SERPL-MCNC: 161 MG/DL (ref 70–99)
HCO3 SERPL-SCNC: 22 MMOL/L (ref 22–29)
HDLC SERPL-MCNC: 32 MG/DL
LDLC SERPL CALC-MCNC: 48 MG/DL
MAGNESIUM SERPL-MCNC: 2 MG/DL (ref 1.7–2.3)
NONHDLC SERPL-MCNC: 78 MG/DL
POTASSIUM SERPL-SCNC: 4.3 MMOL/L (ref 3.4–5.3)
PROT SERPL-MCNC: 6.4 G/DL (ref 6.4–8.3)
SODIUM SERPL-SCNC: 138 MMOL/L (ref 135–145)
TRIGL SERPL-MCNC: 148 MG/DL
TSH SERPL DL<=0.005 MIU/L-ACNC: 2.21 UIU/ML (ref 0.3–4.2)
VIT B12 SERPL-MCNC: 658 PG/ML (ref 232–1245)
VIT D+METAB SERPL-MCNC: 61 NG/ML (ref 20–50)

## 2024-11-08 ENCOUNTER — LAB REQUISITION (OUTPATIENT)
Dept: LAB | Facility: CLINIC | Age: 84
End: 2024-11-08
Payer: COMMERCIAL

## 2024-11-08 DIAGNOSIS — R30.0 DYSURIA: ICD-10-CM

## 2024-11-08 LAB
ALBUMIN UR-MCNC: NEGATIVE MG/DL
APPEARANCE UR: CLEAR
BACTERIA #/AREA URNS HPF: ABNORMAL /HPF
BILIRUB UR QL STRIP: NEGATIVE
COLOR UR AUTO: ABNORMAL
GLUCOSE UR STRIP-MCNC: NEGATIVE MG/DL
HGB UR QL STRIP: NEGATIVE
KETONES UR STRIP-MCNC: NEGATIVE MG/DL
LEUKOCYTE ESTERASE UR QL STRIP: NEGATIVE
NITRATE UR QL: NEGATIVE
PH UR STRIP: 6.5 [PH] (ref 5–7)
RBC URINE: 1 /HPF
SP GR UR STRIP: 1 (ref 1–1.03)
UROBILINOGEN UR STRIP-MCNC: NORMAL MG/DL
WBC URINE: 1 /HPF

## 2024-11-08 PROCEDURE — 81001 URINALYSIS AUTO W/SCOPE: CPT | Mod: ORL | Performed by: INTERNAL MEDICINE

## 2025-01-07 ENCOUNTER — LAB REQUISITION (OUTPATIENT)
Dept: LAB | Facility: CLINIC | Age: 85
End: 2025-01-07
Payer: COMMERCIAL

## 2025-01-07 DIAGNOSIS — I10 ESSENTIAL (PRIMARY) HYPERTENSION: ICD-10-CM

## 2025-01-09 LAB
ALBUMIN SERPL BCG-MCNC: 3.7 G/DL (ref 3.5–5.2)
ALP SERPL-CCNC: 116 U/L (ref 40–150)
ALT SERPL W P-5'-P-CCNC: 22 U/L (ref 0–50)
ANION GAP SERPL CALCULATED.3IONS-SCNC: 8 MMOL/L (ref 7–15)
AST SERPL W P-5'-P-CCNC: 25 U/L (ref 0–45)
BILIRUB SERPL-MCNC: 0.3 MG/DL
BUN SERPL-MCNC: 16.6 MG/DL (ref 8–23)
CALCIUM SERPL-MCNC: 10 MG/DL (ref 8.8–10.4)
CHLORIDE SERPL-SCNC: 106 MMOL/L (ref 98–107)
CREAT SERPL-MCNC: 0.83 MG/DL (ref 0.51–0.95)
EGFRCR SERPLBLD CKD-EPI 2021: 69 ML/MIN/1.73M2
ERYTHROCYTE [DISTWIDTH] IN BLOOD BY AUTOMATED COUNT: 13.2 % (ref 10–15)
GLUCOSE SERPL-MCNC: 63 MG/DL (ref 70–99)
HCO3 SERPL-SCNC: 24 MMOL/L (ref 22–29)
HCT VFR BLD AUTO: 37.8 % (ref 35–47)
HGB BLD-MCNC: 11.8 G/DL (ref 11.7–15.7)
MCH RBC QN AUTO: 28.6 PG (ref 26.5–33)
MCHC RBC AUTO-ENTMCNC: 31.2 G/DL (ref 31.5–36.5)
MCV RBC AUTO: 92 FL (ref 78–100)
PLATELET # BLD AUTO: 294 10E3/UL (ref 150–450)
POTASSIUM SERPL-SCNC: 4.8 MMOL/L (ref 3.4–5.3)
PROT SERPL-MCNC: 6.8 G/DL (ref 6.4–8.3)
RBC # BLD AUTO: 4.13 10E6/UL (ref 3.8–5.2)
SODIUM SERPL-SCNC: 138 MMOL/L (ref 135–145)
WBC # BLD AUTO: 11.1 10E3/UL (ref 4–11)

## 2025-01-09 PROCEDURE — 85027 COMPLETE CBC AUTOMATED: CPT | Mod: ORL | Performed by: INTERNAL MEDICINE

## 2025-01-09 PROCEDURE — 80053 COMPREHEN METABOLIC PANEL: CPT | Mod: ORL | Performed by: INTERNAL MEDICINE

## 2025-01-09 PROCEDURE — 36415 COLL VENOUS BLD VENIPUNCTURE: CPT | Mod: ORL | Performed by: INTERNAL MEDICINE

## 2025-01-09 PROCEDURE — P9604 ONE-WAY ALLOW PRORATED TRIP: HCPCS | Mod: ORL | Performed by: INTERNAL MEDICINE

## 2025-05-02 ENCOUNTER — LAB REQUISITION (OUTPATIENT)
Dept: LAB | Facility: CLINIC | Age: 85
End: 2025-05-02
Payer: COMMERCIAL

## 2025-05-02 DIAGNOSIS — R30.0 DYSURIA: ICD-10-CM

## 2025-05-02 LAB
ALBUMIN UR-MCNC: NEGATIVE MG/DL
APPEARANCE UR: ABNORMAL
BACTERIA #/AREA URNS HPF: ABNORMAL /HPF
BILIRUB UR QL STRIP: NEGATIVE
COLOR UR AUTO: ABNORMAL
GLUCOSE UR STRIP-MCNC: NEGATIVE MG/DL
HGB UR QL STRIP: NEGATIVE
KETONES UR STRIP-MCNC: NEGATIVE MG/DL
LEUKOCYTE ESTERASE UR QL STRIP: NEGATIVE
NITRATE UR QL: NEGATIVE
PH UR STRIP: 6 [PH] (ref 5–7)
RBC URINE: 0 /HPF
SP GR UR STRIP: 1.01 (ref 1–1.03)
SQUAMOUS EPITHELIAL: 11 /HPF
UROBILINOGEN UR STRIP-MCNC: NORMAL MG/DL
WBC URINE: 3 /HPF

## 2025-05-02 PROCEDURE — 81001 URINALYSIS AUTO W/SCOPE: CPT | Mod: ORL | Performed by: INTERNAL MEDICINE

## 2025-05-02 PROCEDURE — 87086 URINE CULTURE/COLONY COUNT: CPT | Mod: ORL | Performed by: INTERNAL MEDICINE

## 2025-05-03 LAB — BACTERIA UR CULT: NORMAL

## 2025-06-17 ENCOUNTER — LAB REQUISITION (OUTPATIENT)
Dept: LAB | Facility: CLINIC | Age: 85
End: 2025-06-17
Payer: COMMERCIAL

## 2025-06-17 DIAGNOSIS — K21.9 GASTRO-ESOPHAGEAL REFLUX DISEASE WITHOUT ESOPHAGITIS: ICD-10-CM

## 2025-06-17 DIAGNOSIS — F01.50 VASCULAR DEMENTIA, UNSPECIFIED SEVERITY, WITHOUT BEHAVIORAL DISTURBANCE, PSYCHOTIC DISTURBANCE, MOOD DISTURBANCE, AND ANXIETY (H): ICD-10-CM

## 2025-06-17 DIAGNOSIS — E78.00 PURE HYPERCHOLESTEROLEMIA, UNSPECIFIED: ICD-10-CM

## 2025-06-17 DIAGNOSIS — E55.9 VITAMIN D DEFICIENCY, UNSPECIFIED: ICD-10-CM

## 2025-06-19 LAB
ALBUMIN SERPL BCG-MCNC: 4.2 G/DL (ref 3.5–5.2)
ALP SERPL-CCNC: 104 U/L (ref 40–150)
ALT SERPL W P-5'-P-CCNC: 22 U/L (ref 0–50)
ANION GAP SERPL CALCULATED.3IONS-SCNC: 9 MMOL/L (ref 7–15)
AST SERPL W P-5'-P-CCNC: 23 U/L (ref 0–45)
BILIRUB SERPL-MCNC: 0.6 MG/DL
BUN SERPL-MCNC: 13.4 MG/DL (ref 8–23)
CALCIUM SERPL-MCNC: 10.1 MG/DL (ref 8.8–10.4)
CHLORIDE SERPL-SCNC: 106 MMOL/L (ref 98–107)
CHOLEST SERPL-MCNC: 125 MG/DL
CREAT SERPL-MCNC: 0.92 MG/DL (ref 0.51–0.95)
EGFRCR SERPLBLD CKD-EPI 2021: 61 ML/MIN/1.73M2
ERYTHROCYTE [DISTWIDTH] IN BLOOD BY AUTOMATED COUNT: 14.4 % (ref 10–15)
FASTING STATUS PATIENT QL REPORTED: ABNORMAL
GLUCOSE SERPL-MCNC: 82 MG/DL (ref 70–99)
HCO3 SERPL-SCNC: 24 MMOL/L (ref 22–29)
HCT VFR BLD AUTO: 39.4 % (ref 35–47)
HDLC SERPL-MCNC: 43 MG/DL
HGB BLD-MCNC: 12.4 G/DL (ref 11.7–15.7)
LDLC SERPL CALC-MCNC: 56 MG/DL
MAGNESIUM SERPL-MCNC: 2 MG/DL (ref 1.7–2.3)
MCH RBC QN AUTO: 28.4 PG (ref 26.5–33)
MCHC RBC AUTO-ENTMCNC: 31.5 G/DL (ref 31.5–36.5)
MCV RBC AUTO: 90 FL (ref 78–100)
NONHDLC SERPL-MCNC: 82 MG/DL
PLATELET # BLD AUTO: 260 10E3/UL (ref 150–450)
POTASSIUM SERPL-SCNC: 4.1 MMOL/L (ref 3.4–5.3)
PROT SERPL-MCNC: 7.4 G/DL (ref 6.4–8.3)
RBC # BLD AUTO: 4.36 10E6/UL (ref 3.8–5.2)
SODIUM SERPL-SCNC: 139 MMOL/L (ref 135–145)
TRIGL SERPL-MCNC: 132 MG/DL
TSH SERPL DL<=0.005 MIU/L-ACNC: 2.28 UIU/ML (ref 0.3–4.2)
VIT D+METAB SERPL-MCNC: 63 NG/ML (ref 20–50)
WBC # BLD AUTO: 8.6 10E3/UL (ref 4–11)

## 2025-06-19 PROCEDURE — 82306 VITAMIN D 25 HYDROXY: CPT | Mod: ORL | Performed by: INTERNAL MEDICINE

## 2025-06-19 PROCEDURE — 84443 ASSAY THYROID STIM HORMONE: CPT | Mod: ORL | Performed by: INTERNAL MEDICINE

## 2025-06-19 PROCEDURE — 83735 ASSAY OF MAGNESIUM: CPT | Mod: ORL | Performed by: INTERNAL MEDICINE

## 2025-06-19 PROCEDURE — P9603 ONE-WAY ALLOW PRORATED MILES: HCPCS | Mod: ORL | Performed by: INTERNAL MEDICINE

## 2025-06-19 PROCEDURE — 80053 COMPREHEN METABOLIC PANEL: CPT | Mod: ORL | Performed by: INTERNAL MEDICINE

## 2025-06-19 PROCEDURE — 36415 COLL VENOUS BLD VENIPUNCTURE: CPT | Mod: ORL | Performed by: INTERNAL MEDICINE

## 2025-06-19 PROCEDURE — 85027 COMPLETE CBC AUTOMATED: CPT | Mod: ORL | Performed by: INTERNAL MEDICINE

## 2025-06-19 PROCEDURE — 80061 LIPID PANEL: CPT | Mod: ORL | Performed by: INTERNAL MEDICINE

## 2025-08-13 ENCOUNTER — APPOINTMENT (OUTPATIENT)
Dept: RADIOLOGY | Facility: HOSPITAL | Age: 85
End: 2025-08-13
Payer: COMMERCIAL

## 2025-08-13 ENCOUNTER — HOSPITAL ENCOUNTER (EMERGENCY)
Facility: HOSPITAL | Age: 85
Discharge: HOME | End: 2025-08-13
Payer: COMMERCIAL

## 2025-08-13 VITALS
WEIGHT: 120.9 LBS | DIASTOLIC BLOOD PRESSURE: 77 MMHG | RESPIRATION RATE: 16 BRPM | TEMPERATURE: 97.9 F | SYSTOLIC BLOOD PRESSURE: 179 MMHG | HEART RATE: 59 BPM | HEIGHT: 62 IN | BODY MASS INDEX: 22.25 KG/M2 | OXYGEN SATURATION: 95 %

## 2025-08-13 DIAGNOSIS — N39.0 UTI (URINARY TRACT INFECTION), UNCOMPLICATED: ICD-10-CM

## 2025-08-13 DIAGNOSIS — I10 ELEVATED BLOOD PRESSURE READING WITH DIAGNOSIS OF HYPERTENSION: Primary | ICD-10-CM

## 2025-08-13 DIAGNOSIS — R94.31 ST SEGMENT CHANGES ON ELECTROCARDIOGRAM: ICD-10-CM

## 2025-08-13 LAB
ALBUMIN UR-MCNC: NEGATIVE MG/DL
ANION GAP SERPL CALCULATED.3IONS-SCNC: 10 MMOL/L (ref 7–15)
APPEARANCE UR: CLEAR
BACTERIA #/AREA URNS HPF: ABNORMAL /HPF
BASOPHILS # BLD AUTO: 0.06 10E3/UL (ref 0–0.2)
BASOPHILS NFR BLD AUTO: 0.7 %
BILIRUB UR QL STRIP: NEGATIVE
BUN SERPL-MCNC: 15.9 MG/DL (ref 8–23)
CALCIUM SERPL-MCNC: 9.9 MG/DL (ref 8.8–10.4)
CHLORIDE SERPL-SCNC: 106 MMOL/L (ref 98–107)
COLOR UR AUTO: ABNORMAL
CREAT SERPL-MCNC: 0.95 MG/DL (ref 0.51–0.95)
EGFRCR SERPLBLD CKD-EPI 2021: 58 ML/MIN/1.73M2
EOSINOPHIL # BLD AUTO: 0.1 10E3/UL (ref 0–0.7)
EOSINOPHIL NFR BLD AUTO: 1.1 %
ERYTHROCYTE [DISTWIDTH] IN BLOOD BY AUTOMATED COUNT: 13.4 % (ref 10–15)
GLUCOSE SERPL-MCNC: 88 MG/DL (ref 70–99)
GLUCOSE UR STRIP-MCNC: NEGATIVE MG/DL
HCO3 SERPL-SCNC: 23 MMOL/L (ref 22–29)
HCT VFR BLD AUTO: 41.1 % (ref 35–47)
HGB BLD-MCNC: 13.2 G/DL (ref 11.7–15.7)
HGB UR QL STRIP: NEGATIVE
IMM GRANULOCYTES # BLD: 0.05 10E3/UL
IMM GRANULOCYTES NFR BLD: 0.6 %
KETONES UR STRIP-MCNC: NEGATIVE MG/DL
LEUKOCYTE ESTERASE UR QL STRIP: ABNORMAL
LYMPHOCYTES # BLD AUTO: 1.47 10E3/UL (ref 0.8–5.3)
LYMPHOCYTES NFR BLD AUTO: 16.6 %
MCH RBC QN AUTO: 28.1 PG (ref 26.5–33)
MCHC RBC AUTO-ENTMCNC: 32.1 G/DL (ref 31.5–36.5)
MCV RBC AUTO: 87.6 FL (ref 78–100)
MONOCYTES # BLD AUTO: 0.42 10E3/UL (ref 0–1.3)
MONOCYTES NFR BLD AUTO: 4.7 %
NEUTROPHILS # BLD AUTO: 6.77 10E3/UL (ref 1.6–8.3)
NEUTROPHILS NFR BLD AUTO: 76.3 %
NITRATE UR QL: NEGATIVE
NRBC # BLD AUTO: <0.03 10E3/UL
NRBC BLD AUTO-RTO: 0 /100
NT-PROBNP SERPL-MCNC: 250 PG/ML (ref 0–624)
PH UR STRIP: 6.5 [PH] (ref 5–7)
PLATELET # BLD AUTO: 250 10E3/UL (ref 150–450)
POTASSIUM SERPL-SCNC: 4.5 MMOL/L (ref 3.4–5.3)
RBC # BLD AUTO: 4.69 10E6/UL (ref 3.8–5.2)
RBC URINE: 1 /HPF
SODIUM SERPL-SCNC: 139 MMOL/L (ref 135–145)
SP GR UR STRIP: 1.01 (ref 1–1.03)
SQUAMOUS EPITHELIAL: <1 /HPF
TROPONIN T SERPL HS-MCNC: 9 NG/L
UROBILINOGEN UR STRIP-MCNC: NORMAL MG/DL
WBC # BLD AUTO: 8.87 10E3/UL (ref 4–11)
WBC URINE: 22 /HPF

## 2025-08-13 PROCEDURE — 93005 ELECTROCARDIOGRAM TRACING: CPT

## 2025-08-13 PROCEDURE — 87186 SC STD MICRODIL/AGAR DIL: CPT

## 2025-08-13 PROCEDURE — 85025 COMPLETE CBC W/AUTO DIFF WBC: CPT

## 2025-08-13 PROCEDURE — 99285 EMERGENCY DEPT VISIT HI MDM: CPT | Mod: 25

## 2025-08-13 PROCEDURE — 81001 URINALYSIS AUTO W/SCOPE: CPT

## 2025-08-13 PROCEDURE — 36415 COLL VENOUS BLD VENIPUNCTURE: CPT

## 2025-08-13 PROCEDURE — 83880 ASSAY OF NATRIURETIC PEPTIDE: CPT

## 2025-08-13 PROCEDURE — 71046 X-RAY EXAM CHEST 2 VIEWS: CPT

## 2025-08-13 PROCEDURE — 80048 BASIC METABOLIC PNL TOTAL CA: CPT

## 2025-08-13 PROCEDURE — 84484 ASSAY OF TROPONIN QUANT: CPT

## 2025-08-13 RX ORDER — CEPHALEXIN 500 MG/1
CAPSULE ORAL
Qty: 21 CAPSULE | Refills: 0 | Status: SHIPPED | OUTPATIENT
Start: 2025-08-13 | End: 2025-08-13

## 2025-08-13 RX ORDER — CEPHALEXIN 500 MG/1
500 CAPSULE ORAL ONCE
Status: DISCONTINUED | OUTPATIENT
Start: 2025-08-13 | End: 2025-08-13

## 2025-08-13 RX ORDER — CEPHALEXIN 500 MG/1
CAPSULE ORAL
Qty: 21 CAPSULE | Refills: 0 | Status: SHIPPED | OUTPATIENT
Start: 2025-08-13

## 2025-08-13 ASSESSMENT — COLUMBIA-SUICIDE SEVERITY RATING SCALE - C-SSRS
6. HAVE YOU EVER DONE ANYTHING, STARTED TO DO ANYTHING, OR PREPARED TO DO ANYTHING TO END YOUR LIFE?: NO
2. HAVE YOU ACTUALLY HAD ANY THOUGHTS OF KILLING YOURSELF IN THE PAST MONTH?: NO
1. IN THE PAST MONTH, HAVE YOU WISHED YOU WERE DEAD OR WISHED YOU COULD GO TO SLEEP AND NOT WAKE UP?: NO

## 2025-08-13 ASSESSMENT — ACTIVITIES OF DAILY LIVING (ADL)
ADLS_ACUITY_SCORE: 41
ADLS_ACUITY_SCORE: 41

## 2025-08-14 ENCOUNTER — DOCUMENTATION ONLY (OUTPATIENT)
Dept: OTHER | Facility: CLINIC | Age: 85
End: 2025-08-14
Payer: COMMERCIAL

## 2025-08-14 LAB
BACTERIA UR CULT: ABNORMAL
BACTERIA UR CULT: ABNORMAL

## 2025-08-15 LAB
BACTERIA UR CULT: ABNORMAL
BACTERIA UR CULT: ABNORMAL

## 2025-08-16 ENCOUNTER — TELEPHONE (OUTPATIENT)
Dept: NURSING | Facility: CLINIC | Age: 85
End: 2025-08-16
Payer: COMMERCIAL

## 2025-08-18 LAB
ATRIAL RATE - MUSE: 63 BPM
DIASTOLIC BLOOD PRESSURE - MUSE: NORMAL MMHG
INTERPRETATION ECG - MUSE: NORMAL
P AXIS - MUSE: 70 DEGREES
PR INTERVAL - MUSE: 158 MS
QRS DURATION - MUSE: 84 MS
QT - MUSE: 418 MS
QTC - MUSE: 427 MS
R AXIS - MUSE: 64 DEGREES
SYSTOLIC BLOOD PRESSURE - MUSE: NORMAL MMHG
T AXIS - MUSE: -4 DEGREES
VENTRICULAR RATE- MUSE: 63 BPM

## 2025-08-23 ENCOUNTER — HEALTH MAINTENANCE LETTER (OUTPATIENT)
Age: 85
End: 2025-08-23